# Patient Record
Sex: FEMALE | Race: OTHER | HISPANIC OR LATINO
[De-identification: names, ages, dates, MRNs, and addresses within clinical notes are randomized per-mention and may not be internally consistent; named-entity substitution may affect disease eponyms.]

---

## 2022-06-01 ENCOUNTER — APPOINTMENT (OUTPATIENT)
Dept: GASTROENTEROLOGY | Facility: CLINIC | Age: 62
End: 2022-06-01

## 2024-01-16 ENCOUNTER — INPATIENT (INPATIENT)
Facility: HOSPITAL | Age: 64
LOS: 3 days | Discharge: HOME | DRG: 40 | End: 2024-01-20
Attending: FAMILY MEDICINE | Admitting: FAMILY MEDICINE
Payer: MEDICARE

## 2024-01-16 VITALS
SYSTOLIC BLOOD PRESSURE: 139 MMHG | HEART RATE: 61 BPM | RESPIRATION RATE: 16 BRPM | TEMPERATURE: 97 F | OXYGEN SATURATION: 100 % | DIASTOLIC BLOOD PRESSURE: 91 MMHG

## 2024-01-16 DIAGNOSIS — E78.0 PURE HYPERCHOLESTEROLEMIA: ICD-10-CM

## 2024-01-16 DIAGNOSIS — I10 ESSENTIAL (PRIMARY) HYPERTENSION: ICD-10-CM

## 2024-01-16 DIAGNOSIS — I63.9 CEREBRAL INFARCTION, UNSPECIFIED: ICD-10-CM

## 2024-01-16 LAB
ALBUMIN SERPL ELPH-MCNC: 3.9 G/DL — SIGNIFICANT CHANGE UP (ref 3.3–5.2)
ALP SERPL-CCNC: 116 U/L — SIGNIFICANT CHANGE UP (ref 40–120)
ALT FLD-CCNC: 20 U/L — SIGNIFICANT CHANGE UP
ANION GAP SERPL CALC-SCNC: 13 MMOL/L — SIGNIFICANT CHANGE UP (ref 5–17)
APTT BLD: 37.2 SEC — HIGH (ref 24.5–35.6)
AST SERPL-CCNC: 30 U/L — SIGNIFICANT CHANGE UP
BASOPHILS # BLD AUTO: 0.02 K/UL — SIGNIFICANT CHANGE UP (ref 0–0.2)
BASOPHILS NFR BLD AUTO: 0.3 % — SIGNIFICANT CHANGE UP (ref 0–2)
BILIRUB SERPL-MCNC: 0.5 MG/DL — SIGNIFICANT CHANGE UP (ref 0.4–2)
BUN SERPL-MCNC: 9.4 MG/DL — SIGNIFICANT CHANGE UP (ref 8–20)
CALCIUM SERPL-MCNC: 9 MG/DL — SIGNIFICANT CHANGE UP (ref 8.4–10.5)
CHLORIDE SERPL-SCNC: 94 MMOL/L — LOW (ref 96–108)
CK SERPL-CCNC: 61 U/L — SIGNIFICANT CHANGE UP (ref 25–170)
CO2 SERPL-SCNC: 26 MMOL/L — SIGNIFICANT CHANGE UP (ref 22–29)
CREAT SERPL-MCNC: 0.67 MG/DL — SIGNIFICANT CHANGE UP (ref 0.5–1.3)
EGFR: 97 ML/MIN/1.73M2 — SIGNIFICANT CHANGE UP
EOSINOPHIL # BLD AUTO: 0 K/UL — SIGNIFICANT CHANGE UP (ref 0–0.5)
EOSINOPHIL NFR BLD AUTO: 0 % — SIGNIFICANT CHANGE UP (ref 0–6)
GLUCOSE BLDC GLUCOMTR-MCNC: 92 MG/DL — SIGNIFICANT CHANGE UP (ref 70–99)
GLUCOSE SERPL-MCNC: 103 MG/DL — HIGH (ref 70–99)
HCT VFR BLD CALC: 39 % — SIGNIFICANT CHANGE UP (ref 34.5–45)
HGB BLD-MCNC: 13.2 G/DL — SIGNIFICANT CHANGE UP (ref 11.5–15.5)
IMM GRANULOCYTES NFR BLD AUTO: 1.1 % — HIGH (ref 0–0.9)
INR BLD: 1.15 RATIO — SIGNIFICANT CHANGE UP (ref 0.85–1.18)
LYMPHOCYTES # BLD AUTO: 0.91 K/UL — LOW (ref 1–3.3)
LYMPHOCYTES # BLD AUTO: 11.6 % — LOW (ref 13–44)
MCHC RBC-ENTMCNC: 29.5 PG — SIGNIFICANT CHANGE UP (ref 27–34)
MCHC RBC-ENTMCNC: 33.8 GM/DL — SIGNIFICANT CHANGE UP (ref 32–36)
MCV RBC AUTO: 87.1 FL — SIGNIFICANT CHANGE UP (ref 80–100)
MONOCYTES # BLD AUTO: 0.31 K/UL — SIGNIFICANT CHANGE UP (ref 0–0.9)
MONOCYTES NFR BLD AUTO: 4 % — SIGNIFICANT CHANGE UP (ref 2–14)
NEUTROPHILS # BLD AUTO: 6.5 K/UL — SIGNIFICANT CHANGE UP (ref 1.8–7.4)
NEUTROPHILS NFR BLD AUTO: 83 % — HIGH (ref 43–77)
PLATELET # BLD AUTO: 188 K/UL — SIGNIFICANT CHANGE UP (ref 150–400)
POTASSIUM SERPL-MCNC: 3.6 MMOL/L — SIGNIFICANT CHANGE UP (ref 3.5–5.3)
POTASSIUM SERPL-SCNC: 3.6 MMOL/L — SIGNIFICANT CHANGE UP (ref 3.5–5.3)
PROT SERPL-MCNC: 8 G/DL — SIGNIFICANT CHANGE UP (ref 6.6–8.7)
PROTHROM AB SERPL-ACNC: 12.7 SEC — SIGNIFICANT CHANGE UP (ref 9.5–13)
RBC # BLD: 4.48 M/UL — SIGNIFICANT CHANGE UP (ref 3.8–5.2)
RBC # FLD: 12.9 % — SIGNIFICANT CHANGE UP (ref 10.3–14.5)
SODIUM SERPL-SCNC: 133 MMOL/L — LOW (ref 135–145)
TROPONIN T, HIGH SENSITIVITY RESULT: 10 NG/L — SIGNIFICANT CHANGE UP (ref 0–51)
WBC # BLD: 7.83 K/UL — SIGNIFICANT CHANGE UP (ref 3.8–10.5)
WBC # FLD AUTO: 7.83 K/UL — SIGNIFICANT CHANGE UP (ref 3.8–10.5)

## 2024-01-16 PROCEDURE — 99291 CRITICAL CARE FIRST HOUR: CPT | Mod: FS

## 2024-01-16 PROCEDURE — 0042T: CPT | Mod: MA

## 2024-01-16 PROCEDURE — 99223 1ST HOSP IP/OBS HIGH 75: CPT | Mod: FS

## 2024-01-16 PROCEDURE — 99285 EMERGENCY DEPT VISIT HI MDM: CPT

## 2024-01-16 PROCEDURE — 99223 1ST HOSP IP/OBS HIGH 75: CPT

## 2024-01-16 PROCEDURE — 71045 X-RAY EXAM CHEST 1 VIEW: CPT | Mod: 26

## 2024-01-16 PROCEDURE — 93010 ELECTROCARDIOGRAM REPORT: CPT

## 2024-01-16 PROCEDURE — 70450 CT HEAD/BRAIN W/O DYE: CPT | Mod: 26,MA

## 2024-01-16 PROCEDURE — 93880 EXTRACRANIAL BILAT STUDY: CPT | Mod: 26

## 2024-01-16 PROCEDURE — 99232 SBSQ HOSP IP/OBS MODERATE 35: CPT | Mod: FS

## 2024-01-16 RX ORDER — ASPIRIN/CALCIUM CARB/MAGNESIUM 324 MG
81 TABLET ORAL ONCE
Refills: 0 | Status: COMPLETED | OUTPATIENT
Start: 2024-01-16 | End: 2024-01-16

## 2024-01-16 RX ORDER — INSULIN LISPRO 100/ML
VIAL (ML) SUBCUTANEOUS
Refills: 0 | Status: DISCONTINUED | OUTPATIENT
Start: 2024-01-16 | End: 2024-01-17

## 2024-01-16 RX ORDER — ACETAMINOPHEN 500 MG
1000 TABLET ORAL ONCE
Refills: 0 | Status: COMPLETED | OUTPATIENT
Start: 2024-01-16 | End: 2024-01-16

## 2024-01-16 RX ORDER — DEXTROSE 50 % IN WATER 50 %
25 SYRINGE (ML) INTRAVENOUS ONCE
Refills: 0 | Status: DISCONTINUED | OUTPATIENT
Start: 2024-01-16 | End: 2024-01-20

## 2024-01-16 RX ORDER — GLUCAGON INJECTION, SOLUTION 0.5 MG/.1ML
1 INJECTION, SOLUTION SUBCUTANEOUS ONCE
Refills: 0 | Status: DISCONTINUED | OUTPATIENT
Start: 2024-01-16 | End: 2024-01-17

## 2024-01-16 RX ORDER — INSULIN LISPRO 100/ML
VIAL (ML) SUBCUTANEOUS AT BEDTIME
Refills: 0 | Status: DISCONTINUED | OUTPATIENT
Start: 2024-01-16 | End: 2024-01-17

## 2024-01-16 RX ORDER — SODIUM CHLORIDE 9 MG/ML
1000 INJECTION, SOLUTION INTRAVENOUS
Refills: 0 | Status: DISCONTINUED | OUTPATIENT
Start: 2024-01-16 | End: 2024-01-17

## 2024-01-16 RX ORDER — DEXTROSE 50 % IN WATER 50 %
15 SYRINGE (ML) INTRAVENOUS ONCE
Refills: 0 | Status: DISCONTINUED | OUTPATIENT
Start: 2024-01-16 | End: 2024-01-17

## 2024-01-16 RX ORDER — ASPIRIN/CALCIUM CARB/MAGNESIUM 324 MG
300 TABLET ORAL DAILY
Refills: 0 | Status: DISCONTINUED | OUTPATIENT
Start: 2024-01-16 | End: 2024-01-16

## 2024-01-16 RX ORDER — SODIUM CHLORIDE 9 MG/ML
2000 INJECTION, SOLUTION INTRAVENOUS ONCE
Refills: 0 | Status: COMPLETED | OUTPATIENT
Start: 2024-01-16 | End: 2024-01-16

## 2024-01-16 RX ORDER — HEPARIN SODIUM 5000 [USP'U]/ML
5000 INJECTION INTRAVENOUS; SUBCUTANEOUS EVERY 12 HOURS
Refills: 0 | Status: DISCONTINUED | OUTPATIENT
Start: 2024-01-16 | End: 2024-01-16

## 2024-01-16 RX ORDER — DEXTROSE 50 % IN WATER 50 %
12.5 SYRINGE (ML) INTRAVENOUS ONCE
Refills: 0 | Status: DISCONTINUED | OUTPATIENT
Start: 2024-01-16 | End: 2024-01-20

## 2024-01-16 RX ORDER — ATORVASTATIN CALCIUM 80 MG/1
80 TABLET, FILM COATED ORAL AT BEDTIME
Refills: 0 | Status: DISCONTINUED | OUTPATIENT
Start: 2024-01-16 | End: 2024-01-17

## 2024-01-16 RX ORDER — PANTOPRAZOLE SODIUM 20 MG/1
40 TABLET, DELAYED RELEASE ORAL
Refills: 0 | Status: DISCONTINUED | OUTPATIENT
Start: 2024-01-16 | End: 2024-01-17

## 2024-01-16 RX ORDER — SODIUM CHLORIDE 9 MG/ML
1000 INJECTION INTRAMUSCULAR; INTRAVENOUS; SUBCUTANEOUS
Refills: 0 | Status: DISCONTINUED | OUTPATIENT
Start: 2024-01-16 | End: 2024-01-17

## 2024-01-16 RX ADMIN — Medication 400 MILLIGRAM(S): at 23:57

## 2024-01-16 RX ADMIN — Medication 81 MILLIGRAM(S): at 12:34

## 2024-01-16 RX ADMIN — SODIUM CHLORIDE 2000 MILLILITER(S): 9 INJECTION, SOLUTION INTRAVENOUS at 11:49

## 2024-01-16 RX ADMIN — SODIUM CHLORIDE 70 MILLILITER(S): 9 INJECTION INTRAMUSCULAR; INTRAVENOUS; SUBCUTANEOUS at 22:28

## 2024-01-16 NOTE — ED ADULT TRIAGE NOTE - NS ED NURSE AMBULANCES
[facility], [facility], [facility]

## 2024-01-16 NOTE — CONSULT NOTE ADULT - SUBJECTIVE AND OBJECTIVE BOX
Patient is a 63y old  Female who presents with a chief complaint of stroke workup (16 Jan 2024 14:32)    HPI:  63 year old female with PMH of HTN,HLP presents with c/o Lt sided weakness, with slurrey speech. Pt  was  last seen by family 9 pm last night upon going to bed, she woke up this morning at 5 am with L sided weakness and slurred speech. [facility] mobile stroke unit transported pt, non-con CTH neg, CTA performed while en route demonstrating R M2 occlusion. CT head/perfusion scan ED showed Mild cortical enhancement   right frontal parietal junction corresponding to an area of ischemia seen.CT PERFUSION: Moderate right parietal ischemia . No core infarct..Per ED,EMS documented NIH score 8,on arrival 2 ,mild rt facial droop/lt sided weekness which is resolved now.Ed spoke with Neurology team.Pt is AAOX3.C/O mild rt sided headache, clear speech, denies any weakness/numbness. Pt reports complained with medicines.     Interval Events:   Pt seen and examined in bed. Notes that her right foot and hand are still numb but has no other complaints.       PMH  HTN,HLP    FH:DM  Social hx: denies smoking /alcohol/illicit drug uses.Lives with brother and Niece           from: CT Brain Perfusion Maps Stroke (01.16.24 @ 11:13) >  IMPRESSION:  HEAD CT: IV contrast present from recent CTA. Mild cortical enhancement   right frontal parietal junction corresponding to an area of ischemia seen   on CT perfusion. No large intracranial hemorrhage.  CT PERFUSION: Moderate right parietal ischemia of 31 mL. No core infarct.    Findings discussed with Dr. Wiggins at 11:19 AM on 1/16/2024    < end of copied text >   (16 Jan 2024 12:30)      HISTORY OF PRESENT ILLNESS:   63yF PMH     PAST MEDICAL & SURGICAL HISTORY:  Hypertension      Hypercholesterolemia      No significant past surgical history        FAMILY HISTORY:      SOCIAL HISTORY:  Tobacco Use: no  EtOH use: no  Substance: no    Allergies    No Known Allergies    Intolerances        REVIEW OF SYSTEMS  Negative except as noted in HPI    HOME MEDICATIONS:  Home Medications:  losartan: 50 milligram(s) orally once a day (16 Jan 2024 12:29)  simvastatin: 10 milligram(s) orally once a day (16 Jan 2024 12:30)      MEDICATIONS:  Antibiotics:    Neuro:    Anticoagulation:  heparin   Injectable 5000 Unit(s) SubCutaneous every 12 hours    OTHER:  atorvastatin 80 milliGRAM(s) Oral at bedtime  dextrose 50% Injectable 12.5 Gram(s) IV Push once  dextrose 50% Injectable 25 Gram(s) IV Push once  dextrose 50% Injectable 25 Gram(s) IV Push once  dextrose Oral Gel 15 Gram(s) Oral once PRN  glucagon  Injectable 1 milliGRAM(s) IntraMuscular once  insulin lispro (ADMELOG) corrective regimen sliding scale   SubCutaneous three times a day before meals  insulin lispro (ADMELOG) corrective regimen sliding scale   SubCutaneous at bedtime  pantoprazole    Tablet 40 milliGRAM(s) Oral before breakfast    IVF:  dextrose 5%. 1000 milliLiter(s) IV Continuous <Continuous>  dextrose 5%. 1000 milliLiter(s) IV Continuous <Continuous>  sodium chloride 0.9%. 1000 milliLiter(s) IV Continuous <Continuous>      Vital Signs Last 24 Hrs  T(C): 37 (16 Jan 2024 16:05), Max: 37 (16 Jan 2024 16:05)  T(F): 98.6 (16 Jan 2024 16:05), Max: 98.6 (16 Jan 2024 16:05)  HR: 58 (16 Jan 2024 16:05) (58 - 93)  BP: 137/76 (16 Jan 2024 16:05) (126/59 - 169/73)  BP(mean): --  RR: 18 (16 Jan 2024 16:05) (16 - 18)  SpO2: 98% (16 Jan 2024 16:05) (98% - 100%)    Parameters below as of 16 Jan 2024 16:05  Patient On (Oxygen Delivery Method): room air          PHYSICAL EXAM:  GENERAL: NAD, well-groomed  HEAD:  Atraumatic, normocephalic   EYES: Conjunctiva and sclera clear; corneal reflex intact  NECK: Supple, nontender to palpation  PIERO COMA SCORE: E- V- M- = 15       E: 4= opens eyes spontaneously 3= to voice 2= to noxious 1= no opening       V: 5= oriented 4= confused 3= inappropriate words 2= incomprehensible sounds 1= nonverbal 1T= intubated       M: 6= follows commands 5= localizes 4= withdraws 3= flexor posturing 2= extensor posturing 1= no movement  MENTAL STATUS: AAO x3; Awake; Opens eyes spontaneously; Appropriately conversant without aphasia, slow to speak; following simple commands   CRANIAL NERVES: Viisual fields full to confrontation, PERRL. EOMI without nystagmus. Facial sensation intact V1-3 distribution b/l. Face symmetric w/ normal eye closure and smile, tongue midline. Hearing grossly intact. Speech clear. Head turning and shoulder shrug intact.   MOTOR: strength 5/5 b/l upper and lower extremities  SENSATION: grossly intact to light touch all extremities  PLANTAR: upgoing/downgoing/mute (Babinski)  CHEST/LUNG: Chest rise and fall equally and bilaterally   HEART: +S1/+S2  ABDOMEN: Soft, nontender, nondistended   EXTREMITIES:  No clubbing, cyanosis, or edema  SKIN: Warm, dry; no rashes or lesions    LABS:                        13.2   7.83  )-----------( 188      ( 16 Jan 2024 11:02 )             39.0     01-16    133<L>  |  94<L>  |  9.4  ----------------------------<  103<H>  3.6   |  26.0  |  0.67    Ca    9.0      16 Jan 2024 11:02    TPro  8.0  /  Alb  3.9  /  TBili  0.5  /  DBili  x   /  AST  30  /  ALT  20  /  AlkPhos  116  01-16    PT/INR - ( 16 Jan 2024 11:02 )   PT: 12.7 sec;   INR: 1.15 ratio         PTT - ( 16 Jan 2024 11:02 )  PTT:37.2 sec  Urinalysis Basic - ( 16 Jan 2024 11:02 )    Color: x / Appearance: x / SG: x / pH: x  Gluc: 103 mg/dL / Ketone: x  / Bili: x / Urobili: x   Blood: x / Protein: x / Nitrite: x   Leuk Esterase: x / RBC: x / WBC x   Sq Epi: x / Non Sq Epi: x / Bacteria: x        CULTURES:      RADIOLOGY & ADDITIONAL STUDIES:    ASSESSMENT AND PLAN:     Assessment: 64 y/o female with a past medical history of HTN/HLD presents with left sided weakness and slurred speech. Stroke imaging revealed right M2 occlusion. NIH of 9 on admission, decreased to a 2. mRs 0.     Plan:   Neuro   - pt will need a formal swallow evaluation prior to taking anything ( including medications ) by mouth  - Please check LDL with in 48 hours of arrival, if > 100 pt will need a statin as per stroke core measures  - Pt will require PT eval prior to discharge as per stroke core measures  - Pt will need smoking cessation education discussed with them prior to discharge as per stroke core measures  - Pt to receive stroke education during stay or at discharge   - CTH f/u imaging   - repeat CTH if acute change in neuro exam or questionable blood on f/u CTH   - MM pain control     CV  - BP <220   - TTE     Respiratory   - RA   - No issues     GI   - NPO for now   - will need formal speech and swallow       - monitor i's and o's   - voiding     Heme  - No issues   - monitor H/H     ID   - monitor WBC   - monitor fever curve   - no current issues     Endo   - F/u A1C and TSH   - CHRISTINE

## 2024-01-16 NOTE — CONSULT NOTE ADULT - ASSESSMENT
ASSESSMENT:     NEURO:   -Neurologically with improvement from initial presentation and slight RLE drift and right facial asymmetry (could be baseline as per patient)  -Continue close monitoring for neurologic deterioration    - Stroke neuro checks q 2 hour   - Permissive HTN or  SBP goal   -ANTITHROMBOTIC THERAPY: ASA 81mg daily   -Obtain lipid panel and A1C   -titrate statin to LDL goal less than 70  -Obtain MRI Brain w/o, MRA Head w/o and Neck w/contrast  -Dysphagia screen: pass/fail   -Physical therapy/OT/Speech eval/treatment.     -CARDIOVASCULAR: obtain TTE, cardiac monitoring w/ telemetry for now, further evaluation pending findings of noted workup                              -HEMATOLOGY: H/H 13.2/39.0. Platelets 188. patient should have all age and risk appropriate malignancy screenings with PCP or sooner if clinically suspected      DVT ppx: Heparin s.c [] LMWH []     PULMONARY: CXR ___ , protecting airway, saturating well     RENAL: BUN/Cr _, monitor urine output, maintain adequate hydration       Na Goal:  135-145     [place]:    ID: afebrile, no leukocytosis, monitor for si/sx of infection     OTHER:  condition and plan of care d/w patient, questions and concerns addressed.     DISPOSITION: Rehab or home depending on PT eval once stable and workup is complete      CORE MEASURES:        Admission NIHSS:      Tenecteplase : [] YES [] NO      LDL/HDL/A1C:     Depression Screen- if depression hx and/or present      Statin Therapy:     Dysphagia Screen: [] PASS [] FAIL     Smoking [] YES [] NO      Afib [] YES [] NO     Stroke Education [] YES [] NO    Obtain screening lower extremity venous ultrasound in patients who meet 1 or more of the following criteria as patient is high risk for DVT/PE on admission:   [] History of DVT/PE  []Hypercoagulable states (Factor V Leiden, Cancer, OCP, etc. )  []Prolonged immobility (hemiplegia/hemiparesis/post operative or any other extended immobilization)  [] Transferred from outside facility (Rehab or Long term care)  [] Age </= to 50 ASSESSMENT:     NEURO:   -Neurologically with improvement from initial presentation and slight RLE drift and right facial asymmetry (could be baseline as per patient)  -Continue close monitoring for neurologic deterioration    - Stroke neuro checks q 2 hour   - Permissive HTN of SBP up to 180. Avoid rapid fluctuations and hypotension.    -ANTITHROMBOTIC THERAPY: ASA 81mg daily   -Obtain lipid panel and A1C   -titrate statin to LDL goal less than 70  -Obtain MRI Brain w/o, MRA Head w/o and Neck w/contrast  -Dysphagia screen: Pass. advance as tolerated.  -Physical therapy/OT/Speech eval/treatment.     -CARDIOVASCULAR: obtain TTE, cardiac monitoring w/ telemetry for now, further evaluation pending findings of noted workup                              -HEMATOLOGY: H/H 13.2/39.0. Platelets 188. patient should have all age and risk appropriate malignancy screenings with PCP or sooner if clinically suspected      DVT ppx: Heparin s.c [x] LMWH []     PULMONARY: CXR noting no focal consolidation. Did note a 7 mm dense nodule at the right lung base may represent a granuloma. A noncontrast CT could be performed for confirmation.   -protecting airway, saturating well     RENAL: BUN/Cr 9.4/0.67.  monitor urine output, maintain adequate hydration       Na Goal:  135-145    ID: afebrile, no leukocytosis, monitor for si/sx of infection     OTHER:  condition and plan of care d/w patient, questions and concerns addressed.     DISPOSITION: Rehab or home depending on PT eval once stable and workup is complete      CORE MEASURES:        Admission NIHSS: 3     Tenecteplase : [] YES [x] NO      LDL/HDL/A1C: pending       Depression Screen- if depression hx and/or present      Statin Therapy: pending      Dysphagia Screen: [x] PASS [] FAIL     Smoking [] YES [x] NO      Afib [] YES [x] NO     Stroke Education [] YES [] NO- pending and ordered     Obtain screening lower extremity venous ultrasound in patients who meet 1 or more of the following criteria as patient is high risk for DVT/PE on admission:   [] History of DVT/PE  []Hypercoagulable states (Factor V Leiden, Cancer, OCP, etc. )  []Prolonged immobility (hemiplegia/hemiparesis/post operative or any other extended immobilization)  [] Transferred from outside facility (Rehab or Long term care)  [] Age </= to 50 ASSESSMENT: Patient is a 63 year old female with PMHx of HTN,HLD who presented to [facility] on 1/16/24 with as per ED note left sided weakness and slurred speech. As per ED note patient was last seen by family 9:00 pm on 1/15/24 upon going to bed. Patient woke up the morning of 1/16/24 at 5:00 am with left sided weakness and slurred speech. [facility] mobile stroke unit transported patient and non-con CTH was negative and CTA performed while en route demonstrated right M2 occlusion. CTP in [facility] ED showed mild cortical enhancement of theright frontal parietal junction corresponding to an area of ischemia seen Per ED,EMS documented NIHSS score of 8, however upon arrival to [facility] NIHSS was a 2. Upon stroke team evaluation patient complained of right hand numbness and complete resolution of left sided weakness and dysarthria. Patient not a tenecteplase or mechanical thrombectomy candidate due to improving symptoms and almost back to baseline.     Etiology embolic stroke of undetermined source. will need MRI brain w/out contrast for further evaluation and management.     NEURO:   -Neurologically with improvement from initial presentation and slight RLE drift and right facial asymmetry (could be baseline as per patient)  -Continue close monitoring for neurologic deterioration    - Close monitoring for change in neurological status as patient is still within the intervention window and with right M2 occlusion. Stroke neuro checks q 1 hour as per neuro ICU  -SBP goal   - There is potential hyperdensity noted on the CT head. Would suggest CT head noncon in 6 hours to rule out any potential hemorrhage   -Avoid rapid fluctuations and hypotension.    -ANTITHROMBOTIC THERAPY: ASA 81mg daily   -Obtain lipid panel and A1C   -titrate statin to LDL goal less than 70  -Obtain MRI Brain w/o, MRA Head w/o and Neck w/contrast  -Dysphagia screen: Pass. advance as tolerated.  -Physical therapy/OT/Speech eval/treatment.     -CARDIOVASCULAR: obtain TTE, cardiac monitoring w/ telemetry for now, further evaluation pending findings of noted workup                              -HEMATOLOGY: H/H 13.2/39.0. Platelets 188. patient should have all age and risk appropriate malignancy screenings with PCP or sooner if clinically suspected      DVT ppx: Heparin s.c [x] LMWH []     PULMONARY: CXR noting no focal consolidation. Did note a 7 mm dense nodule at the right lung base may represent a granuloma. A noncontrast CT could be performed for confirmation.   -protecting airway, saturating well     RENAL: BUN/Cr 9.4/0.67.  monitor urine output, maintain adequate hydration       Na Goal:  135-145    ID: afebrile, no leukocytosis, monitor for si/sx of infection     OTHER:  condition and plan of care d/w patient, questions and concerns addressed.     DISPOSITION: Rehab or home depending on PT eval once stable and workup is complete      CORE MEASURES:        Admission NIHSS: 3     Tenecteplase : [] YES [x] NO      LDL/HDL/A1C: pending       Depression Screen- if depression hx and/or present      Statin Therapy: pending      Dysphagia Screen: [x] PASS [] FAIL     Smoking [] YES [x] NO      Afib [] YES [x] NO     Stroke Education [] YES [] NO- pending and ordered     Obtain screening lower extremity venous ultrasound in patients who meet 1 or more of the following criteria as patient is high risk for DVT/PE on admission:   [] History of DVT/PE  []Hypercoagulable states (Factor V Leiden, Cancer, OCP, etc. )  []Prolonged immobility (hemiplegia/hemiparesis/post operative or any other extended immobilization)  [] Transferred from outside facility (Rehab or Long term care)  [] Age </= to 50 ASSESSMENT: Patient is a 63 year old female with PMHx of HTN,HLD who presented to [facility] on 1/16/24 with as per ED note left sided weakness and slurred speech. As per ED note patient was last seen by family 9:00 pm on 1/15/24 upon going to bed. Patient woke up the morning of 1/16/24 at 5:00 am with left sided weakness and slurred speech. [facility] mobile stroke unit transported patient and non-con CTH was negative and CTA performed while en route demonstrated right M2 occlusion. CTP in [facility] ED showed mild cortical enhancement of theright frontal parietal junction corresponding to an area of ischemia seen Per ED,EMS documented NIHSS score of 8, however upon arrival to [facility] NIHSS was a 2. Upon stroke team evaluation patient complained of right hand numbness and complete resolution of left sided weakness and dysarthria. Patient not a tenecteplase or mechanical thrombectomy candidate due to improving symptoms and almost back to baseline.     Etiology embolic stroke of undetermined source. will need MRI brain w/out contrast for further evaluation and management.     NEURO:   -Neurologically with improvement from initial presentation and slight RLE drift and right facial asymmetry (could be baseline as per patient)  -Continue close monitoring for neurologic deterioration    - Close monitoring for change in neurological status as patient is still within the intervention window and with right M2 occlusion. Stroke neuro checks q 1 hour as per neuro ICU  -SBP goal   - There is potential hyperdensity noted on the CT head. Would suggest CT head noncon in 6 hours to rule out any potential hemorrhage   -Avoid rapid fluctuations and hypotension.    -ANTITHROMBOTIC THERAPY: ASA 81mg daily   -Obtain lipid panel and A1C   -titrate statin to LDL goal less than 70  -Obtain MRI Brain w/o, MRA Head w/o and Neck w/contrast  -Dysphagia screen: Pass. advance as tolerated.  -Physical therapy/OT/Speech eval/treatment.     -CARDIOVASCULAR: obtain TTE, cardiac monitoring w/ telemetry for now, further evaluation pending findings of noted workup                              -HEMATOLOGY: H/H 13.2/39.0. Platelets 188. patient should have all age and risk appropriate malignancy screenings with PCP or sooner if clinically suspected      DVT ppx: Heparin s.c [x] LMWH []     PULMONARY: CXR noting no focal consolidation. Did note a 7 mm dense nodule at the right lung base may represent a granuloma. A noncontrast CT could be performed for confirmation.   -protecting airway, saturating well     RENAL: BUN/Cr 9.4/0.67.  monitor urine output, maintain adequate hydration       Na Goal:  135-145    ID: afebrile, no leukocytosis, monitor for si/sx of infection     OTHER:  condition and plan of care d/w patient, questions and concerns addressed.     DISPOSITION: Rehab or home depending on PT eval once stable and workup is complete      CORE MEASURES:        Admission NIHSS: 3     Tenecteplase : [] YES [x] NO      LDL/HDL/A1C: pending       Depression Screen- if depression hx and/or present      Statin Therapy: pending      Dysphagia Screen: [x] PASS [] FAIL     Smoking [] YES [x] NO      Afib [] YES [x] NO     Stroke Education [] YES [] NO- pending and ordered     Obtain screening lower extremity venous ultrasound in patients who meet 1 or more of the following criteria as patient is high risk for DVT/PE on admission:   [] History of DVT/PE  []Hypercoagulable states (Factor V Leiden, Cancer, OCP, etc. )  []Prolonged immobility (hemiplegia/hemiparesis/post operative or any other extended immobilization)  [] Transferred from outside facility (Rehab or Long term care)  [] Age </= to 50 ASSESSMENT: Patient is a 63 year old female with PMHx of HTN,HLD who presented to [facility] on 1/16/24 with as per ED note left sided weakness and slurred speech. As per ED note patient was last seen by family 9:00 pm on 1/15/24 upon going to bed. Patient woke up the morning of 1/16/24 at 5:00 am with left sided weakness and slurred speech. [facility] mobile stroke unit transported patient and non-con CTH was negative and CTA performed while en route demonstrated right M2 occlusion. CTP in [facility] ED showed mild cortical enhancement of theright frontal parietal junction corresponding to an area of ischemia seen Per ED,EMS documented NIHSS score of 8, however upon arrival to [facility] NIHSS was a 2. Upon stroke team evaluation patient complained of right hand numbness and complete resolution of left sided weakness and dysarthria. Patient not a tenecteplase or mechanical thrombectomy candidate due to improving symptoms and almost back to baseline. Patient admitted to neuro ICU for further monitoring for change in neurological exam.    Etiology embolic stroke of undetermined source. will need MRI brain w/out contrast for further evaluation and management.     NEURO:   -Neurologically with improvement from initial presentation and slight RLE drift and right facial asymmetry (could be baseline as per patient)  -Continue close monitoring for neurologic deterioration    - Close monitoring for change in neurological status as patient is still within the intervention window and with right M2 occlusion. Stroke neuro checks q 1 hour as per neuro ICU  - There is potential hyperdensity noted on the CT head. Would suggest CT head noncon and CTA head w/ and CTA neck w/ in 6 hours for further evaluation   -SBP goal 140-160. Avoid rapid fluctuations and hypotension.    -ANTITHROMBOTIC THERAPY: ASA 81mg daily.  -Obtain lipid panel and A1C   -titrate statin to LDL goal less than 70  -Obtain MRI Brain w/o, MRA Head w/o and Neck w/contrast  -Dysphagia screen: Pass. advance as tolerated.  -Physical therapy/OT/Speech eval/treatment.     -CARDIOVASCULAR: obtain TTE. cardiac monitoring w/ telemetry for now, further evaluation pending findings of noted workup                              -HEMATOLOGY: H/H 13.2/39.0. Platelets 188. patient should have all age and risk appropriate malignancy screenings with PCP or sooner if clinically suspected      DVT ppx: Heparin s.c [x] LMWH []     PULMONARY: CXR noting no focal consolidation. Did note a 7 mm dense nodule at the right lung base may represent a granuloma. A noncontrast CT could be performed for confirmation.   -protecting airway, saturating well     RENAL: BUN/Cr 9.4/0.67.  monitor urine output, maintain adequate hydration       Na Goal:  135-145    ID: afebrile, no leukocytosis, monitor for si/sx of infection     OTHER:  condition and plan of care d/w patient, questions and concerns addressed.     DISPOSITION: Rehab or home depending on PT eval once stable and workup is complete      CORE MEASURES:        Admission NIHSS: 3     Tenecteplase : [] YES [x] NO      LDL/HDL/A1C: pending       Depression Screen- if depression hx and/or present      Statin Therapy: pending      Dysphagia Screen: [x] PASS [] FAIL     Smoking [] YES [x] NO      Afib [] YES [x] NO     Stroke Education [] YES [] NO- pending and ordered     Obtain screening lower extremity venous ultrasound in patients who meet 1 or more of the following criteria as patient is high risk for DVT/PE on admission:   [] History of DVT/PE  []Hypercoagulable states (Factor V Leiden, Cancer, OCP, etc. )  []Prolonged immobility (hemiplegia/hemiparesis/post operative or any other extended immobilization)  [] Transferred from outside facility (Rehab or Long term care)  [] Age </= to 50 ASSESSMENT: Patient is a 63 year old female with PMHx of HTN,HLD who presented to [facility] on 1/16/24 with as per ED note left sided weakness and slurred speech. As per ED note patient was last seen by family 9:00 pm on 1/15/24 upon going to bed. Patient woke up the morning of 1/16/24 at 5:00 am with left sided weakness and slurred speech. [facility] mobile stroke unit transported patient and non-con CTH was negative and CTA performed while en route demonstrated right M2 occlusion. CTP in [facility] ED showed mild cortical enhancement of theright frontal parietal junction corresponding to an area of ischemia seen Per ED,EMS documented NIHSS score of 8, however upon arrival to [facility] NIHSS was a 2. Upon stroke team evaluation patient complained of right hand numbness and complete resolution of left sided weakness and dysarthria. Patient not a tenecteplase or mechanical thrombectomy candidate due to improving symptoms and almost back to baseline. Patient admitted to neuro ICU for further monitoring for change in neurological exam.    Etiology embolic stroke of undetermined source. will need MRI brain w/out contrast for further evaluation and management.     NEURO:   -Neurologically with improvement from initial presentation and slight RLE drift and right facial asymmetry (could be baseline as per patient)  -Continue close monitoring for neurologic deterioration    - Close monitoring for change in neurological status as patient is still within the intervention window and with right M2 occlusion. Stroke neuro checks q 1 hour as per neuro ICU  - There is potential hyperdensity noted on the CT head. Would suggest CT head noncon and CTA head w/ and CTA neck w/ in 6 hours for further evaluation   -SBP goal 140-160. Avoid rapid fluctuations and hypotension.    -ANTITHROMBOTIC THERAPY: ASA 81mg daily.  -Obtain lipid panel and A1C   -titrate statin to LDL goal less than 70  -Obtain MRI Brain w/o, MRA Head w/o and Neck w/contrast  -Dysphagia screen: Pass. advance as tolerated.  -Physical therapy/OT/Speech eval/treatment.     -CARDIOVASCULAR: obtain TTE. cardiac monitoring w/ telemetry for now, further evaluation pending findings of noted workup                              -HEMATOLOGY: H/H 13.2/39.0. Platelets 188. patient should have all age and risk appropriate malignancy screenings with PCP or sooner if clinically suspected      DVT ppx: Heparin s.c [x] LMWH []     PULMONARY: CXR noting no focal consolidation. Did note a 7 mm dense nodule at the right lung base may represent a granuloma. A noncontrast CT could be performed for confirmation.   -protecting airway, saturating well     RENAL: BUN/Cr 9.4/0.67.  monitor urine output, maintain adequate hydration       Na Goal:  135-145    ID: afebrile, no leukocytosis, monitor for si/sx of infection     OTHER:  condition and plan of care d/w patient, questions and concerns addressed.     DISPOSITION: Rehab or home depending on PT eval once stable and workup is complete      CORE MEASURES:        Admission NIHSS: 3     Tenecteplase : [] YES [x] NO      LDL/HDL/A1C: pending       Depression Screen- if depression hx and/or present      Statin Therapy: pending      Dysphagia Screen: [x] PASS [] FAIL     Smoking [] YES [x] NO      Afib [] YES [x] NO     Stroke Education [] YES [] NO- pending and ordered     Obtain screening lower extremity venous ultrasound in patients who meet 1 or more of the following criteria as patient is high risk for DVT/PE on admission:   [] History of DVT/PE  []Hypercoagulable states (Factor V Leiden, Cancer, OCP, etc. )  []Prolonged immobility (hemiplegia/hemiparesis/post operative or any other extended immobilization)  [] Transferred from outside facility (Rehab or Long term care)  [] Age </= to 50

## 2024-01-16 NOTE — H&P ADULT - HISTORY OF PRESENT ILLNESS
63 year old female with PMH of HTN,HLP presents with c/o Lt sided weakness, with slurrey speech. Pt  was  last seen by family 9 pm last night upon going to bed, she woke up this morning at 5 am with L sided weakness and slurred speech. [facility] mobile stroke unit transported pt, non-con CTH neg, CTA performed while en route demonstrating R M2 occlusion. CT head/perfusion scan ED showed Mild cortical enhancement   right frontal parietal junction corresponding to an area of ischemia seen.CT PERFUSION: Moderate right parietal ischemia . No core infarct..Per ED,EMS documented NIH score 8,on arrival 2 ,mild rt facial droop/lt sided weekness which is resolved now.Ed spoke with Neurology team.Pt is AAOX3.C/O mild rt sided headache, clear speech, denies any weakness/numbness. Pt reports complained with medicines.       PMH  HTN,HLP    FH:DM  Social hx: denies smoking /alcohol/illicit drug uses.Lives with brother and Niece           from: CT Brain Perfusion Maps Stroke (01.16.24 @ 11:13) >  IMPRESSION:  HEAD CT: IV contrast present from recent CTA. Mild cortical enhancement   right frontal parietal junction corresponding to an area of ischemia seen   on CT perfusion. No large intracranial hemorrhage.  CT PERFUSION: Moderate right parietal ischemia of 31 mL. No core infarct.    Findings discussed with Dr. Wiggins at 11:19 AM on 1/16/2024    < end of copied text >    63 year old female with PMH of HTN,HLP presents with c/o Lt sided weakness, with slurrey speech. Pt  was  last seen by family 9 pm last night upon going to bed, she woke up this morning at 5 am with L sided weakness and slurred speech. [facility] mobile stroke unit transported pt, non-con CTH neg, CTA performed while en route demonstrating R M2 occlusion. CT head/perfusion scan ED showed Mild cortical enhancement   right frontal parietal junction corresponding to an area of ischemia seen.CT PERFUSION: Moderate right parietal ischemia . No core infarct..Per ED,EMS documented NIH score 8,on arrival 2 ,mild rt facial droop/lt sided weekness which is resolved now.Ed spoke with Neurology team.Pt is AAOX3.C/O mild rt sided headache, clear speech, denies any weakness/numbness. Pt reports complained with medicines.       PMH  HTN,HLP    FH:DM  Social hx: denies smoking /alcohol/illicit drug uses.Lives with brother and Niece           from: CT Brain Perfusion Maps Stroke (01.16.24 @ 11:13) >  IMPRESSION:  HEAD CT: IV contrast present from recent CTA. Mild cortical enhancement   right frontal parietal junction corresponding to an area of ischemia seen   on CT perfusion. No large intracranial hemorrhage.  CT PERFUSION: Moderate right parietal ischemia of 31 mL. No core infarct.    Findings discussed with Dr. Wiggins at 11:19 AM on 1/16/2024    < end of copied text >

## 2024-01-16 NOTE — H&P ADULT - NSHPPHYSICALEXAM_GEN_ALL_CORE
T(C): 36.6 (01-16-24 @ 11:46), Max: 36.6 (01-16-24 @ 11:46)  HR: 93 (01-16-24 @ 11:46) (61 - 93)  BP: 169/73 (01-16-24 @ 11:46) (139/91 - 169/73)  RR: 16 (01-16-24 @ 11:46) (16 - 16)  SpO2: 100% (01-16-24 @ 11:46) (100% - 100%)    GEN - NAD  HEENT - NCAT, EOMI, FELICIA  RESP - CTA BL, no wheeze/rhonchi/crackles. not on supplemental O2.  CARDIO - NS1S2, RRR. No murmurs  ABD - Soft/Non tender/Non distended. Normal BS x4 quadrants.   Ext - No CRISTIANE.  MSK - full ROM of BL upper and lower extremities without pain or restriction. BL 5/5 strength on upper and lower extremities.   Neuro - cn 2-12 grossly intact..no visible seizure activity appreciated. no tremor. gait not observed. no facial drop present.  Psych- AAOx3. . attentive. normal affect.

## 2024-01-16 NOTE — H&P ADULT - ASSESSMENT
63 year old female with PMH of HTN,HLP presents with c/o Lt sided weakness, with slurrey speech. Pt  was  last seen by family 9 pm last night upon going to bed, she woke up this morning at 5 am with L sided weakness and slurred speech. [facility] mobile stroke unit transported pt, non-con CTH neg, CTA performed while en route demonstrating R M2 occlusion. CT head/perfusion scan ED showed Mild cortical enhancement   right frontal parietal junction corresponding to an area of ischemia seen.CT PERFUSION: Moderate right parietal ischemia . No core infarct.    Acute CVA   -CTA performed while en route demonstrating R M2 occlusion.   -CT head/perfusion scan ED showed Mild cortical enhancement right frontal parietal junction corresponding to an area of ischemia seen.  -CT PERFUSION: Moderate right parietal ischemia   -Neurocheck q2hr  -Permissive htn  -Passed dysphagia screen  -MRI Brain  -ASA,Statin/sq heparin  -SLP/PT/OT  -a1c,Lipid panel  -Monitor NSR, NL trop  -TTE  -f/u Neurology rec      HTN  -hold losartan now   -permissive htn    HLP  -Statin  -lipid panel    dvt ppx sq heparin  gi ppx ppi    Plan of care dw pt via  #[id number]  63 year old female with PMH of HTN,HLP presents with c/o Lt sided weakness, with slurrey speech. Pt  was  last seen by family 9 pm last night upon going to bed, she woke up this morning at 5 am with L sided weakness and slurred speech. [facility] mobile stroke unit transported pt, non-con CTH neg, CTA performed while en route demonstrating R M2 occlusion. CT head/perfusion scan ED showed Mild cortical enhancement   right frontal parietal junction corresponding to an area of ischemia seen.CT PERFUSION: Moderate right parietal ischemia . No core infarct.    Acute CVA   -CTA performed while en route demonstrating R M2 occlusion.   -CT head/perfusion scan ED showed Mild cortical enhancement right frontal parietal junction corresponding to an area of ischemia seen.  -CT PERFUSION: Moderate right parietal ischemia   -Neurocheck q2hr  -Permissive htn  -Passed dysphagia screen  -MRI Brain  -ASA,Statin/sq heparin  -SLP/PT/OT  -a1c,Lipid panel  -Monitor NSR, NL trop  -TTE  -f/u Neurology rec      HTN  -hold losartan now   -permissive htn    HLP  -Statin  -lipid panel    dvt ppx sq heparin  gi ppx ppi    Plan of care dw pt via  #[id number]

## 2024-01-16 NOTE — CONSULT NOTE ADULT - SUBJECTIVE AND OBJECTIVE BOX
Preliminary note, official note pending attending review/signature.                               [facility] Stroke Team  CC:  HPI:     PAST MEDICAL & SURGICAL HISTORY:  Hypertension  Hypercholesterolemia  No significant past surgical history  MEDICATIONS  (STANDING):  atorvastatin 80 milliGRAM(s) Oral at bedtime  heparin   Injectable 5000 Unit(s) SubCutaneous every 12 hours  pantoprazole    Tablet 40 milliGRAM(s) Oral before breakfast      Allergies-No Known Allergies    SOCIAL HISTORY:  no tob,   no alcohol   no drugs    FAMILY HISTORY:  denies any fam stroke hx     ROS: 14 point ROS negative other than what is present in HPI or below    Vital Signs Last 24 Hrs  T(C): 36.5 (16 Jan 2024 12:50), Max: 36.6 (16 Jan 2024 11:46)  T(F): 97.7 (16 Jan 2024 12:50), Max: 97.8 (16 Jan 2024 11:46)  HR: 65 (16 Jan 2024 14:05) (61 - 93)  BP: 159/70 (16 Jan 2024 14:05) (126/59 - 169/73)  BP(mean): --  RR: 18 (16 Jan 2024 14:05) (16 - 18)  SpO2: 98% (16 Jan 2024 14:05) (98% - 100%)    Parameters below as of 16 Jan 2024 14:05  Patient On (Oxygen Delivery Method): room air    General: NAD    Detailed Neurologic Exam:    Mental status: The patient is awake and alert and has normal attention span.  The patient is fully oriented in 3 spheres. The patient is oriented to current events. The patient is able to name objects, follow commands, repeat sentences.    Cranial nerves: Right facial asymmetry. Pupils equal and react symmetrically to light. There is no visual field deficit to confrontation. Extraocular motion is full with no nystagmus. There is no ptosis. Facial sensation is intact. Tongue is midline.    Motor: There is normal bulk and tone.  There is no tremor.  Strength is 5/5 in the right arm. No drift   RLE: Strength 5/5 with drift that does not hit the bed within 10 sec   Strength is 5/5 in the left arm and leg.    Sensation: Intact to light touch and pin in 4 extremities. no extinction.     Cerebellar: There is no dysmetria on finger to nose testing.    Gait : deferred    NIH SS: 2  DATE: 1/16/24  TIME: 11:00 am   1A: Level of consciousness (0-3): 0  1B: Questions (0-2): 0  1C: Commands (0-2): 0  2: Gaze (0-2): 0  3: Visual fields (0-3): 0  4: Facial palsy (0-3): 1 (possibly baseline as per patient)  MOTOR:  5A: Left arm motor drift (0-4): 0  5B: Right arm motor drift (0-4): 0  6A: Left leg motor drift (0-4): 0  6B: Right leg motor drift (0-4): 1  7: Limb ataxia (0-2): 0  SENSORY:  8: Sensation (0-2): 0  SPEECH:  9: Language (0-3): 0  10: Dysarthria (0-2): 0  EXTINCTION:  11: Extinction/inattention (0-2): 0    TOTAL SCORE: 2    prehospital mRS=0      LABS:                         13.2   7.83  )-----------( 188      ( 16 Jan 2024 11:02 )             39.0       01-16    133<L>  |  94<L>  |  9.4  ----------------------------<  103<H>  3.6   |  26.0  |  0.67    Ca    9.0      16 Jan 2024 11:02    TPro  8.0  /  Alb  3.9  /  TBili  0.5  /  DBili  x   /  AST  30  /  ALT  20  /  AlkPhos  116  01-16      PT/INR - ( 16 Jan 2024 11:02 )   PT: 12.7 sec;   INR: 1.15 ratio         PTT - ( 16 Jan 2024 11:02 )  PTT:37.2 sec    Lipid panel: pending     HgbA1c: pending     RADIOLOGY & ADDITIONAL STUDIES (independently reviewed unless otherwise noted):  CT Brain/CTP Stroke Protocol (01.16.24 @ 11:08)   IMPRESSION:  HEAD CT: IV contrast present from recent CTA. Mild cortical enhancement   right frontal parietal junction corresponding to an area of ischemia seen   on CT perfusion. No large intracranial hemorrhage.  CT PERFUSION: Moderate right parietal ischemia of 31 mL. No core infarct.           Preliminary note, official note pending attending review/signature.                               [facility] Stroke Team  CC:  HPI:     PAST MEDICAL & SURGICAL HISTORY:  Hypertension  Hypercholesterolemia  No significant past surgical history  MEDICATIONS  (STANDING):  atorvastatin 80 milliGRAM(s) Oral at bedtime  heparin   Injectable 5000 Unit(s) SubCutaneous every 12 hours  pantoprazole    Tablet 40 milliGRAM(s) Oral before breakfast      Allergies-No Known Allergies    SOCIAL HISTORY:  no tob,   no alcohol   no drugs    FAMILY HISTORY:  denies any fam stroke hx     ROS: 14 point ROS negative other than what is present in HPI or below    Vital Signs Last 24 Hrs  T(C): 36.5 (16 Jan 2024 12:50), Max: 36.6 (16 Jan 2024 11:46)  T(F): 97.7 (16 Jan 2024 12:50), Max: 97.8 (16 Jan 2024 11:46)  HR: 65 (16 Jan 2024 14:05) (61 - 93)  BP: 159/70 (16 Jan 2024 14:05) (126/59 - 169/73)  BP(mean): --  RR: 18 (16 Jan 2024 14:05) (16 - 18)  SpO2: 98% (16 Jan 2024 14:05) (98% - 100%)    Parameters below as of 16 Jan 2024 14:05  Patient On (Oxygen Delivery Method): room air    General: NAD    Detailed Neurologic Exam:    Mental status: The patient is awake and alert and has normal attention span.  The patient is fully oriented in 3 spheres. The patient is oriented to current events. The patient is able to name objects, follow commands, repeat sentences.    Cranial nerves: Right facial asymmetry. Pupils equal and react symmetrically to light. There is no visual field deficit to confrontation. Extraocular motion is full with no nystagmus. There is no ptosis. Facial sensation is intact. Tongue is midline.    Motor: There is normal bulk and tone.  There is no tremor.  Strength is 5/5 in the right arm. No drift   RLE: Strength 5/5 with drift that does not hit the bed within 10 sec   Strength is 5/5 in the left arm and leg.    Sensation: Intact to light touch and pin in 4 extremities. no extinction.     Cerebellar: There is no dysmetria on finger to nose testing.    Gait : deferred    NIH SS: 2  DATE: 1/16/24  TIME: 11:00 am   1A: Level of consciousness (0-3): 0  1B: Questions (0-2): 0  1C: Commands (0-2): 0  2: Gaze (0-2): 0  3: Visual fields (0-3): 0  4: Facial palsy (0-3): 1 (possibly baseline as per patient)  MOTOR:  5A: Left arm motor drift (0-4): 0  5B: Right arm motor drift (0-4): 0  6A: Left leg motor drift (0-4): 0  6B: Right leg motor drift (0-4): 1  7: Limb ataxia (0-2): 0  SENSORY:  8: Sensation (0-2): 0  SPEECH:  9: Language (0-3): 0  10: Dysarthria (0-2): 0  EXTINCTION:  11: Extinction/inattention (0-2): 0    TOTAL SCORE: 2    prehospital mRS=0      LABS:                         13.2   7.83  )-----------( 188      ( 16 Jan 2024 11:02 )             39.0       01-16    133<L>  |  94<L>  |  9.4  ----------------------------<  103<H>  3.6   |  26.0  |  0.67    Ca    9.0      16 Jan 2024 11:02    TPro  8.0  /  Alb  3.9  /  TBili  0.5  /  DBili  x   /  AST  30  /  ALT  20  /  AlkPhos  116  01-16      PT/INR - ( 16 Jan 2024 11:02 )   PT: 12.7 sec;   INR: 1.15 ratio         PTT - ( 16 Jan 2024 11:02 )  PTT:37.2 sec    Lipid panel: pending     HgbA1c: pending     RADIOLOGY & ADDITIONAL STUDIES (independently reviewed unless otherwise noted):  CT Brain/CTP Stroke Protocol (01.16.24 @ 11:08)   IMPRESSION:  HEAD CT: IV contrast present from recent CTA. Mild cortical enhancement   right frontal parietal junction corresponding to an area of ischemia seen   on CT perfusion. No large intracranial hemorrhage.  CT PERFUSION: Moderate right parietal ischemia of 31 mL. No core infarct.           Preliminary note, official note pending attending review/signature.                               [facility] Stroke Team  CC:  HPI:     PAST MEDICAL & SURGICAL HISTORY:  Hypertension  Hypercholesterolemia  No significant past surgical history  MEDICATIONS  (STANDING):  atorvastatin 80 milliGRAM(s) Oral at bedtime  heparin   Injectable 5000 Unit(s) SubCutaneous every 12 hours  pantoprazole    Tablet 40 milliGRAM(s) Oral before breakfast      Allergies-No Known Allergies    SOCIAL HISTORY:  no tob,   no alcohol   no drugs    FAMILY HISTORY:  denies any fam stroke hx     ROS: 14 point ROS negative other than what is present in HPI or below    Vital Signs Last 24 Hrs  T(C): 36.5 (16 Jan 2024 12:50), Max: 36.6 (16 Jan 2024 11:46)  T(F): 97.7 (16 Jan 2024 12:50), Max: 97.8 (16 Jan 2024 11:46)  HR: 65 (16 Jan 2024 14:05) (61 - 93)  BP: 159/70 (16 Jan 2024 14:05) (126/59 - 169/73)  BP(mean): --  RR: 18 (16 Jan 2024 14:05) (16 - 18)  SpO2: 98% (16 Jan 2024 14:05) (98% - 100%)    Parameters below as of 16 Jan 2024 14:05  Patient On (Oxygen Delivery Method): room air    General: NAD    Detailed Neurologic Exam:    Mental status: The patient is awake and alert and has normal attention span.  The patient is fully oriented in 3 spheres. The patient is oriented to current events. The patient is able to name objects, follow commands, repeat sentences.    Cranial nerves: Right facial asymmetry. Pupils equal and react symmetrically to light. There is no visual field deficit to confrontation. Extraocular motion is full with no nystagmus. There is no ptosis. Facial sensation is intact. Tongue is midline.    Motor: There is normal bulk and tone.  There is no tremor.  Strength is 5/5 in the right arm. No drift   RLE: Strength 5/5 with drift that does not hit the bed within 10 sec   Strength is 5/5 in the left arm and leg.    Sensation: Intact to light touch and pin in 4 extremities. no extinction.     Cerebellar: There is no dysmetria on finger to nose testing.    Gait : deferred    NIH SS: 2  DATE: 1/16/24  TIME: 11:00 am   1A: Level of consciousness (0-3): 0  1B: Questions (0-2): 0  1C: Commands (0-2): 0  2: Gaze (0-2): 0  3: Visual fields (0-3): 0  4: Facial palsy (0-3): 1 (possibly baseline as per patient)  MOTOR:  5A: Left arm motor drift (0-4): 0  5B: Right arm motor drift (0-4): 0  6A: Left leg motor drift (0-4): 0  6B: Right leg motor drift (0-4): 1  7: Limb ataxia (0-2): 0  SENSORY:  8: Sensation (0-2): 0  SPEECH:  9: Language (0-3): 0  10: Dysarthria (0-2): 0  EXTINCTION:  11: Extinction/inattention (0-2): 0    TOTAL SCORE: 2    prehospital mRS=0      LABS:                         13.2   7.83  )-----------( 188      ( 16 Jan 2024 11:02 )             39.0       01-16    133<L>  |  94<L>  |  9.4  ----------------------------<  103<H>  3.6   |  26.0  |  0.67    Ca    9.0      16 Jan 2024 11:02    TPro  8.0  /  Alb  3.9  /  TBili  0.5  /  DBili  x   /  AST  30  /  ALT  20  /  AlkPhos  116  01-16      PT/INR - ( 16 Jan 2024 11:02 )   PT: 12.7 sec;   INR: 1.15 ratio         PTT - ( 16 Jan 2024 11:02 )  PTT:37.2 sec    Lipid panel: pending     HgbA1c: pending     RADIOLOGY & ADDITIONAL STUDIES (independently reviewed unless otherwise noted):  CT Brain/CTP Stroke Protocol (01.16.24 @ 11:08)   IMPRESSION:  HEAD CT: IV contrast present from recent CTA. Mild cortical enhancement   right frontal parietal junction corresponding to an area of ischemia seen   on CT perfusion. No large intracranial hemorrhage.  CT PERFUSION: Moderate right parietal ischemia of 31 mL. No core infarct.    As per mobile stroke unit: -" non-con head neg and CTA demonstrating right M2 occlusion            Preliminary note, official note pending attending review/signature.                               [facility] Stroke Team  CC:  HPI:     PAST MEDICAL & SURGICAL HISTORY:  Hypertension  Hypercholesterolemia  No significant past surgical history  MEDICATIONS  (STANDING):  atorvastatin 80 milliGRAM(s) Oral at bedtime  heparin   Injectable 5000 Unit(s) SubCutaneous every 12 hours  pantoprazole    Tablet 40 milliGRAM(s) Oral before breakfast      Allergies-No Known Allergies    SOCIAL HISTORY:  no tob,   no alcohol   no drugs    FAMILY HISTORY:  denies any fam stroke hx     ROS: 14 point ROS negative other than what is present in HPI or below    Vital Signs Last 24 Hrs  T(C): 36.5 (16 Jan 2024 12:50), Max: 36.6 (16 Jan 2024 11:46)  T(F): 97.7 (16 Jan 2024 12:50), Max: 97.8 (16 Jan 2024 11:46)  HR: 65 (16 Jan 2024 14:05) (61 - 93)  BP: 159/70 (16 Jan 2024 14:05) (126/59 - 169/73)  BP(mean): --  RR: 18 (16 Jan 2024 14:05) (16 - 18)  SpO2: 98% (16 Jan 2024 14:05) (98% - 100%)    Parameters below as of 16 Jan 2024 14:05  Patient On (Oxygen Delivery Method): room air    General: NAD    Detailed Neurologic Exam:    Mental status: The patient is awake and alert and has normal attention span.  The patient is fully oriented in 3 spheres. The patient is oriented to current events. The patient is able to name objects, follow commands, repeat sentences.    Cranial nerves: Right facial asymmetry. Pupils equal and react symmetrically to light. There is no visual field deficit to confrontation. Extraocular motion is full with no nystagmus. There is no ptosis. Facial sensation is intact. Tongue is midline.    Motor: There is normal bulk and tone.  There is no tremor.  Strength is 5/5 in the right arm. No drift   RLE: Strength 5/5 with drift that does not hit the bed within 10 sec   Strength is 5/5 in the left arm and leg.    Sensation: Intact to light touch and pin in 4 extremities. no extinction.     Cerebellar: There is no dysmetria on finger to nose testing.    Gait : deferred    NIH SS: 2  DATE: 1/16/24  TIME: 11:00 am   1A: Level of consciousness (0-3): 0  1B: Questions (0-2): 0  1C: Commands (0-2): 0  2: Gaze (0-2): 0  3: Visual fields (0-3): 0  4: Facial palsy (0-3): 1 (possibly baseline as per patient)  MOTOR:  5A: Left arm motor drift (0-4): 0  5B: Right arm motor drift (0-4): 0  6A: Left leg motor drift (0-4): 0  6B: Right leg motor drift (0-4): 1  7: Limb ataxia (0-2): 0  SENSORY:  8: Sensation (0-2): 0  SPEECH:  9: Language (0-3): 0  10: Dysarthria (0-2): 0  EXTINCTION:  11: Extinction/inattention (0-2): 0    TOTAL SCORE: 2    prehospital mRS=0      LABS:                         13.2   7.83  )-----------( 188      ( 16 Jan 2024 11:02 )             39.0       01-16    133<L>  |  94<L>  |  9.4  ----------------------------<  103<H>  3.6   |  26.0  |  0.67    Ca    9.0      16 Jan 2024 11:02    TPro  8.0  /  Alb  3.9  /  TBili  0.5  /  DBili  x   /  AST  30  /  ALT  20  /  AlkPhos  116  01-16      PT/INR - ( 16 Jan 2024 11:02 )   PT: 12.7 sec;   INR: 1.15 ratio         PTT - ( 16 Jan 2024 11:02 )  PTT:37.2 sec    Lipid panel: pending     HgbA1c: pending     RADIOLOGY & ADDITIONAL STUDIES (independently reviewed unless otherwise noted):  CT Brain/CTP Stroke Protocol (01.16.24 @ 11:08)   IMPRESSION:  HEAD CT: IV contrast present from recent CTA. Mild cortical enhancement   right frontal parietal junction corresponding to an area of ischemia seen   on CT perfusion. No large intracranial hemorrhage.  CT PERFUSION: Moderate right parietal ischemia of 31 mL. No core infarct.    As per mobile stroke unit: -" non-con head neg and CTA demonstrating right M2 occlusion            Preliminary note, official note pending attending review/signature.                               [facility] Stroke Team  CC: left sided weakness? upon stroke eval right hand numbness   HPI: Patient is a 63 year old female with PMHx of HTN,HLD who presented to [facility] on 1/16/24 with as per ED note left sided weakness and slurred speech. As per ED note patient was last seen by family 9:00 pm on 1/15/24 upon going to bed. Patient woke up the morning of 1/16/24 at 5:00 am with left sided weakness and slurred speech. [facility] mobile stroke unit transported patient and non-con CTH was negative and CTA performed while en route demonstrated right M2 occlusion. CTP in [facility] ED showed mild cortical enhancement of the  right frontal parietal junction corresponding to an area of ischemia seen Per ED,EMS documented NIHSS score of 8, however upon arrival to [facility] NIHSS was a 2. Stroke team called for consult. On presentation patient complained of right hand numbness and complete resolution of left sided weakness and dysarthria.       PAST MEDICAL & SURGICAL HISTORY:  Hypertension  Hypercholesterolemia  No significant past surgical history  MEDICATIONS  (STANDING):  atorvastatin 80 milliGRAM(s) Oral at bedtime  heparin   Injectable 5000 Unit(s) SubCutaneous every 12 hours  pantoprazole    Tablet 40 milliGRAM(s) Oral before breakfast      Allergies-No Known Allergies    SOCIAL HISTORY:  no tob,   no alcohol   no drugs    FAMILY HISTORY:  denies any fam stroke hx     ROS: 14 point ROS negative other than what is present in HPI or below    Vital Signs Last 24 Hrs  T(C): 36.5 (16 Jan 2024 12:50), Max: 36.6 (16 Jan 2024 11:46)  T(F): 97.7 (16 Jan 2024 12:50), Max: 97.8 (16 Jan 2024 11:46)  HR: 65 (16 Jan 2024 14:05) (61 - 93)  BP: 159/70 (16 Jan 2024 14:05) (126/59 - 169/73)  BP(mean): --  RR: 18 (16 Jan 2024 14:05) (16 - 18)  SpO2: 98% (16 Jan 2024 14:05) (98% - 100%)    Parameters below as of 16 Jan 2024 14:05  Patient On (Oxygen Delivery Method): room air    General: NAD    Detailed Neurologic Exam:    Mental status: The patient is awake and alert and has normal attention span.  The patient is fully oriented in 3 spheres. The patient is oriented to current events. The patient is able to name objects, follow commands, repeat sentences.    Cranial nerves: Right facial asymmetry. Pupils equal and react symmetrically to light. There is no visual field deficit to confrontation. Extraocular motion is full with no nystagmus. There is no ptosis. Facial sensation is intact. Tongue is midline.    Motor: There is normal bulk and tone.  There is no tremor.  Strength is 5/5 in the right arm. No drift   RLE: Strength 5/5 with drift that does not hit the bed within 10 sec   Strength is 5/5 in the left arm and leg.    Sensation: Intact to light touch and pin in 4 extremities. no extinction.     Cerebellar: There is no dysmetria on finger to nose testing.    Gait : deferred    [facility] SS: 2  DATE: 1/16/24  TIME: 11:00 am   1A: Level of consciousness (0-3): 0  1B: Questions (0-2): 0  1C: Commands (0-2): 0  2: Gaze (0-2): 0  3: Visual fields (0-3): 0  4: Facial palsy (0-3): 1 (possibly baseline as per patient)  MOTOR:  5A: Left arm motor drift (0-4): 0  5B: Right arm motor drift (0-4): 0  6A: Left leg motor drift (0-4): 0  6B: Right leg motor drift (0-4): 1  7: Limb ataxia (0-2): 0  SENSORY:  8: Sensation (0-2): 0  SPEECH:  9: Language (0-3): 0  10: Dysarthria (0-2): 0  EXTINCTION:  11: Extinction/inattention (0-2): 0    TOTAL SCORE: 2    prehospital mRS=0      LABS:                         13.2   7.83  )-----------( 188      ( 16 Jan 2024 11:02 )             39.0       01-16    133<L>  |  94<L>  |  9.4  ----------------------------<  103<H>  3.6   |  26.0  |  0.67    Ca    9.0      16 Jan 2024 11:02    TPro  8.0  /  Alb  3.9  /  TBili  0.5  /  DBili  x   /  AST  30  /  ALT  20  /  AlkPhos  116  01-16      PT/INR - ( 16 Jan 2024 11:02 )   PT: 12.7 sec;   INR: 1.15 ratio         PTT - ( 16 Jan 2024 11:02 )  PTT:37.2 sec    Lipid panel: pending     HgbA1c: pending     RADIOLOGY & ADDITIONAL STUDIES (independently reviewed unless otherwise noted):  CT Brain/CTP Stroke Protocol (01.16.24 @ 11:08)   IMPRESSION:  HEAD CT: IV contrast present from recent CTA. Mild cortical enhancement   right frontal parietal junction corresponding to an area of ischemia seen   on CT perfusion. No large intracranial hemorrhage.  CT PERFUSION: Moderate right parietal ischemia of 31 mL. No core infarct.    As per mobile stroke unit: -" non-con head neg and CTA demonstrating right M2 occlusion            Preliminary note, official note pending attending review/signature.                               [facility] Stroke Team  CC: left sided weakness? upon stroke eval right hand numbness   HPI: Patient is a 63 year old female with PMHx of HTN,HLD who presented to [facility] on 1/16/24 with as per ED note left sided weakness and slurred speech. As per ED note patient was last seen by family 9:00 pm on 1/15/24 upon going to bed. Patient woke up the morning of 1/16/24 at 5:00 am with left sided weakness and slurred speech. [facility] mobile stroke unit transported patient and non-con CTH was negative and CTA performed while en route demonstrated right M2 occlusion. CTP in [facility] ED showed mild cortical enhancement of the  right frontal parietal junction corresponding to an area of ischemia seen Per ED,EMS documented NIHSS score of 8, however upon arrival to [facility] NIHSS was a 2. Stroke team called for consult. On presentation patient complained of right hand numbness and complete resolution of left sided weakness and dysarthria.       PAST MEDICAL & SURGICAL HISTORY:  Hypertension  Hypercholesterolemia  No significant past surgical history  MEDICATIONS  (STANDING):  atorvastatin 80 milliGRAM(s) Oral at bedtime  heparin   Injectable 5000 Unit(s) SubCutaneous every 12 hours  pantoprazole    Tablet 40 milliGRAM(s) Oral before breakfast      Allergies-No Known Allergies    SOCIAL HISTORY:  no tob,   no alcohol   no drugs    FAMILY HISTORY:  denies any fam stroke hx     ROS: 14 point ROS negative other than what is present in HPI or below    Vital Signs Last 24 Hrs  T(C): 36.5 (16 Jan 2024 12:50), Max: 36.6 (16 Jan 2024 11:46)  T(F): 97.7 (16 Jan 2024 12:50), Max: 97.8 (16 Jan 2024 11:46)  HR: 65 (16 Jan 2024 14:05) (61 - 93)  BP: 159/70 (16 Jan 2024 14:05) (126/59 - 169/73)  BP(mean): --  RR: 18 (16 Jan 2024 14:05) (16 - 18)  SpO2: 98% (16 Jan 2024 14:05) (98% - 100%)    Parameters below as of 16 Jan 2024 14:05  Patient On (Oxygen Delivery Method): room air    General: NAD    Detailed Neurologic Exam:    Mental status: The patient is awake and alert and has normal attention span.  The patient is fully oriented in 3 spheres. The patient is oriented to current events. The patient is able to name objects, follow commands, repeat sentences.    Cranial nerves: Right facial asymmetry. Pupils equal and react symmetrically to light. There is no visual field deficit to confrontation. Extraocular motion is full with no nystagmus. There is no ptosis. Facial sensation is intact. Tongue is midline.    Motor: There is normal bulk and tone.  There is no tremor.  Strength is 5/5 in the right arm. No drift   RLE: Strength 5/5 with drift that does not hit the bed within 10 sec   Strength is 5/5 in the left arm and leg.    Sensation: Intact to light touch and pin in 4 extremities. no extinction.     Cerebellar: There is no dysmetria on finger to nose testing.    Gait : deferred    [facility] SS: 2  DATE: 1/16/24  TIME: 11:00 am   1A: Level of consciousness (0-3): 0  1B: Questions (0-2): 0  1C: Commands (0-2): 0  2: Gaze (0-2): 0  3: Visual fields (0-3): 0  4: Facial palsy (0-3): 1 (possibly baseline as per patient)  MOTOR:  5A: Left arm motor drift (0-4): 0  5B: Right arm motor drift (0-4): 0  6A: Left leg motor drift (0-4): 0  6B: Right leg motor drift (0-4): 1  7: Limb ataxia (0-2): 0  SENSORY:  8: Sensation (0-2): 0  SPEECH:  9: Language (0-3): 0  10: Dysarthria (0-2): 0  EXTINCTION:  11: Extinction/inattention (0-2): 0    TOTAL SCORE: 2    prehospital mRS=0      LABS:                         13.2   7.83  )-----------( 188      ( 16 Jan 2024 11:02 )             39.0       01-16    133<L>  |  94<L>  |  9.4  ----------------------------<  103<H>  3.6   |  26.0  |  0.67    Ca    9.0      16 Jan 2024 11:02    TPro  8.0  /  Alb  3.9  /  TBili  0.5  /  DBili  x   /  AST  30  /  ALT  20  /  AlkPhos  116  01-16      PT/INR - ( 16 Jan 2024 11:02 )   PT: 12.7 sec;   INR: 1.15 ratio         PTT - ( 16 Jan 2024 11:02 )  PTT:37.2 sec    Lipid panel: pending     HgbA1c: pending     RADIOLOGY & ADDITIONAL STUDIES (independently reviewed unless otherwise noted):  CT Brain/CTP Stroke Protocol (01.16.24 @ 11:08)   IMPRESSION:  HEAD CT: IV contrast present from recent CTA. Mild cortical enhancement   right frontal parietal junction corresponding to an area of ischemia seen   on CT perfusion. No large intracranial hemorrhage.  CT PERFUSION: Moderate right parietal ischemia of 31 mL. No core infarct.    As per mobile stroke unit: -" non-con head neg and CTA demonstrating right M2 occlusion                                         [facility] Stroke Team  CC: left sided weakness? upon stroke eval right hand numbness   HPI: Patient is a 63 year old female with PMHx of HTN,HLD who presented to [facility] on 1/16/24 with as per ED note left sided weakness and slurred speech. As per ED note patient was last seen by family 9:00 pm on 1/15/24 upon going to bed. Patient woke up the morning of 1/16/24 at 5:00 am with left sided weakness and slurred speech. [facility] mobile stroke unit transported patient and non-con CTH was negative and CTA performed while en route demonstrated right M2 occlusion. CTP in [facility] ED showed mild cortical enhancement of the  right frontal parietal junction corresponding to an area of ischemia seen Per ED,EMS documented NIHSS score of 8, however upon arrival to [facility] NIHSS was a 2. Stroke team called for consult. On presentation patient complained of right hand numbness and complete resolution of left sided weakness and dysarthria.       PAST MEDICAL & SURGICAL HISTORY:  Hypertension  Hypercholesterolemia  No significant past surgical history  MEDICATIONS  (STANDING):  atorvastatin 80 milliGRAM(s) Oral at bedtime  heparin   Injectable 5000 Unit(s) SubCutaneous every 12 hours  pantoprazole    Tablet 40 milliGRAM(s) Oral before breakfast      Allergies-No Known Allergies    SOCIAL HISTORY:  no tob,   no alcohol   no drugs    FAMILY HISTORY:  denies any fam stroke hx     ROS: 14 point ROS negative other than what is present in HPI or below    Vital Signs Last 24 Hrs  T(C): 36.5 (16 Jan 2024 12:50), Max: 36.6 (16 Jan 2024 11:46)  T(F): 97.7 (16 Jan 2024 12:50), Max: 97.8 (16 Jan 2024 11:46)  HR: 65 (16 Jan 2024 14:05) (61 - 93)  BP: 159/70 (16 Jan 2024 14:05) (126/59 - 169/73)  BP(mean): --  RR: 18 (16 Jan 2024 14:05) (16 - 18)  SpO2: 98% (16 Jan 2024 14:05) (98% - 100%)    Parameters below as of 16 Jan 2024 14:05  Patient On (Oxygen Delivery Method): room air    General: NAD    Detailed Neurologic Exam:    Mental status: The patient is awake and alert and has normal attention span.  The patient is fully oriented in 3 spheres. The patient is oriented to current events. The patient is able to name objects, follow commands, repeat sentences.    Cranial nerves: Right facial asymmetry. Pupils equal and react symmetrically to light. There is no visual field deficit to confrontation. Extraocular motion is full with no nystagmus. There is no ptosis. Facial sensation is intact. Tongue is midline.    Motor: There is normal bulk and tone.  There is no tremor.  Strength is 5/5 in the right arm. No drift   RLE: Strength 5/5 with drift that does not hit the bed within 10 sec   Strength is 5/5 in the left arm and leg.    Sensation: Intact to light touch and pin in 4 extremities. no extinction.     Cerebellar: There is no dysmetria on finger to nose testing.    Gait : deferred    [facility] SS: 2  DATE: 1/16/24  TIME: 11:00 am   1A: Level of consciousness (0-3): 0  1B: Questions (0-2): 0  1C: Commands (0-2): 0  2: Gaze (0-2): 0  3: Visual fields (0-3): 0  4: Facial palsy (0-3): 1 (possibly baseline as per patient)  MOTOR:  5A: Left arm motor drift (0-4): 0  5B: Right arm motor drift (0-4): 0  6A: Left leg motor drift (0-4): 0  6B: Right leg motor drift (0-4): 1  7: Limb ataxia (0-2): 0  SENSORY:  8: Sensation (0-2): 0  SPEECH:  9: Language (0-3): 0  10: Dysarthria (0-2): 0  EXTINCTION:  11: Extinction/inattention (0-2): 0    TOTAL SCORE: 2    prehospital mRS=0      LABS:                         13.2   7.83  )-----------( 188      ( 16 Jan 2024 11:02 )             39.0       01-16    133<L>  |  94<L>  |  9.4  ----------------------------<  103<H>  3.6   |  26.0  |  0.67    Ca    9.0      16 Jan 2024 11:02    TPro  8.0  /  Alb  3.9  /  TBili  0.5  /  DBili  x   /  AST  30  /  ALT  20  /  AlkPhos  116  01-16      PT/INR - ( 16 Jan 2024 11:02 )   PT: 12.7 sec;   INR: 1.15 ratio         PTT - ( 16 Jan 2024 11:02 )  PTT:37.2 sec    Lipid panel: pending     HgbA1c: pending     RADIOLOGY & ADDITIONAL STUDIES (independently reviewed unless otherwise noted):  CT Brain/CTP Stroke Protocol (01.16.24 @ 11:08)   IMPRESSION:  HEAD CT: IV contrast present from recent CTA. Mild cortical enhancement   right frontal parietal junction corresponding to an area of ischemia seen   on CT perfusion. No large intracranial hemorrhage.  CT PERFUSION: Moderate right parietal ischemia of 31 mL. No core infarct.    As per mobile stroke unit: -" non-con head neg and CTA demonstrating right M2 occlusion                                         [facility] Stroke Team  CC: left sided weakness? upon stroke eval right hand numbness   HPI: Patient is a 63 year old female with PMHx of HTN,HLD who presented to [facility] on 1/16/24 with as per ED note left sided weakness and slurred speech. As per ED note patient was last seen by family 9:00 pm on 1/15/24 upon going to bed. Patient woke up the morning of 1/16/24 at 5:00 am with left sided weakness and slurred speech. [facility] mobile stroke unit transported patient and non-con CTH was negative and CTA performed while en route demonstrated right M2 occlusion. CTP in [facility] ED showed mild cortical enhancement of the  right frontal parietal junction corresponding to an area of ischemia seen Per ED,EMS documented NIHSS score of 8, however upon arrival to [facility] NIHSS was a 2. Stroke team called for consult. On presentation patient complained of right hand numbness and complete resolution of left sided weakness and dysarthria.       PAST MEDICAL & SURGICAL HISTORY:  Hypertension  Hypercholesterolemia  No significant past surgical history  MEDICATIONS  (STANDING):  atorvastatin 80 milliGRAM(s) Oral at bedtime  heparin   Injectable 5000 Unit(s) SubCutaneous every 12 hours  pantoprazole    Tablet 40 milliGRAM(s) Oral before breakfast      Allergies-No Known Allergies    SOCIAL HISTORY:  no tob,   no alcohol   no drugs    FAMILY HISTORY:  denies any fam stroke hx     ROS: 14 point ROS negative other than what is present in HPI or below    Vital Signs Last 24 Hrs  T(C): 36.5 (16 Jan 2024 12:50), Max: 36.6 (16 Jan 2024 11:46)  T(F): 97.7 (16 Jan 2024 12:50), Max: 97.8 (16 Jan 2024 11:46)  HR: 65 (16 Jan 2024 14:05) (61 - 93)  BP: 159/70 (16 Jan 2024 14:05) (126/59 - 169/73)  BP(mean): --  RR: 18 (16 Jan 2024 14:05) (16 - 18)  SpO2: 98% (16 Jan 2024 14:05) (98% - 100%)    Parameters below as of 16 Jan 2024 14:05  Patient On (Oxygen Delivery Method): room air    General: NAD    Detailed Neurologic Exam:    Mental status: The patient is awake and alert and has normal attention span.  The patient is fully oriented in 3 spheres. The patient is oriented to current events. The patient is able to name objects, follow commands, repeat sentences.    Cranial nerves: Right facial asymmetry. Pupils equal and react symmetrically to light. There is no visual field deficit to confrontation. Extraocular motion is full with no nystagmus. There is no ptosis. Facial sensation is intact. Tongue is midline.    Motor: There is normal bulk and tone.  There is no tremor.  Strength is 5/5 in the right arm. No drift   RLE: Strength 5/5 with drift that does not hit the bed within 10 sec   Strength is 5/5 in the left arm and leg.    Sensation: Intact to light touch and pin in 4 extremities. no extinction.     Cerebellar: There is no dysmetria on finger to nose testing.    Gait : deferred    [facility] SS: 2  DATE: 1/16/24  TIME: 11:00 am   1A: Level of consciousness (0-3): 0  1B: Questions (0-2): 0  1C: Commands (0-2): 0  2: Gaze (0-2): 0  3: Visual fields (0-3): 0  4: Facial palsy (0-3): 1 (possibly baseline as per patient)  MOTOR:  5A: Left arm motor drift (0-4): 0  5B: Right arm motor drift (0-4): 0  6A: Left leg motor drift (0-4): 0  6B: Right leg motor drift (0-4): 1  7: Limb ataxia (0-2): 0  SENSORY:  8: Sensation (0-2): 0  SPEECH:  9: Language (0-3): 0  10: Dysarthria (0-2): 0  EXTINCTION:  11: Extinction/inattention (0-2): 0    TOTAL SCORE: 2    prehospital mRS=0      LABS:                         13.2   7.83  )-----------( 188      ( 16 Jan 2024 11:02 )             39.0       01-16    133<L>  |  94<L>  |  9.4  ----------------------------<  103<H>  3.6   |  26.0  |  0.67    Ca    9.0      16 Jan 2024 11:02    TPro  8.0  /  Alb  3.9  /  TBili  0.5  /  DBili  x   /  AST  30  /  ALT  20  /  AlkPhos  116  01-16      PT/INR - ( 16 Jan 2024 11:02 )   PT: 12.7 sec;   INR: 1.15 ratio         PTT - ( 16 Jan 2024 11:02 )  PTT:37.2 sec    Lipid panel: pending     HgbA1c: pending     RADIOLOGY & ADDITIONAL STUDIES (independently reviewed unless otherwise noted):  CT Brain/CTP Stroke Protocol (01.16.24 @ 11:08)   IMPRESSION:  HEAD CT: IV contrast present from recent CTA. Mild cortical enhancement   right frontal parietal junction corresponding to an area of ischemia seen   on CT perfusion. No large intracranial hemorrhage.  CT PERFUSION: Moderate right parietal ischemia of 31 mL. No core infarct.    As per mobile stroke unit: -" non-con head neg and CTA demonstrating right M2 occlusion

## 2024-01-16 NOTE — ED ADULT NURSE NOTE - OBJECTIVE STATEMENT
patient brought in by EMS reporting left sided weakness, left sided numbness, and facial droop that the patient woke up.   Patient reports went to bed at 9 oclock pm  Code stroke activated, MD quarles at bed side for Eval

## 2024-01-16 NOTE — ED PROVIDER NOTE - CLINICAL SUMMARY MEDICAL DECISION MAKING FREE TEXT BOX
Pt with acute L CVA, not a tnk candidate given time and not an intervention candidate. admitted for stroke

## 2024-01-16 NOTE — ED PROVIDER NOTE - OBJECTIVE STATEMENT
63 year old female presents with CVA. Pt LKW was 9 pm last night upon going to bed, she woke up this morning at 5 am with L sided weakness and slurred speech. [facility] mobile stroke unit transported pt, non-con CTH neg, CTA performed while en route demonstrating R M2 occlusion. 63 year old female presents with CVA. Pt LKW was 9 pm last night upon going to bed, she woke up this morning at 5 am with L sided weakness and slurred speech. [facility] mobile stroke unit transported pt, non-con CTH neg, CTA performed while en route demonstrating R M2 occlusion.

## 2024-01-16 NOTE — ED ADULT NURSE REASSESSMENT NOTE - NS ED NURSE REASSESS COMMENT FT1
Gave report to Lucía in cdu, pt a&ox4, NIH 2 for left arm/leg drift, telemetry box in place, VSS, respirations even and unlabored on room air, no distress noted.

## 2024-01-17 DIAGNOSIS — I63.9 CEREBRAL INFARCTION, UNSPECIFIED: ICD-10-CM

## 2024-01-17 PROBLEM — Z00.00 ENCOUNTER FOR PREVENTIVE HEALTH EXAMINATION: Status: ACTIVE | Noted: 2024-01-17

## 2024-01-17 LAB
A1C WITH ESTIMATED AVERAGE GLUCOSE RESULT: 5.5 % — SIGNIFICANT CHANGE UP (ref 4–5.6)
ALBUMIN SERPL ELPH-MCNC: 3.6 G/DL — SIGNIFICANT CHANGE UP (ref 3.3–5.2)
ALP SERPL-CCNC: 101 U/L — SIGNIFICANT CHANGE UP (ref 40–120)
ALT FLD-CCNC: 18 U/L — SIGNIFICANT CHANGE UP
ANION GAP SERPL CALC-SCNC: 14 MMOL/L — SIGNIFICANT CHANGE UP (ref 5–17)
ANION GAP SERPL CALC-SCNC: 15 MMOL/L — SIGNIFICANT CHANGE UP (ref 5–17)
AST SERPL-CCNC: 23 U/L — SIGNIFICANT CHANGE UP
BILIRUB SERPL-MCNC: 0.5 MG/DL — SIGNIFICANT CHANGE UP (ref 0.4–2)
BUN SERPL-MCNC: 8.1 MG/DL — SIGNIFICANT CHANGE UP (ref 8–20)
BUN SERPL-MCNC: 8.3 MG/DL — SIGNIFICANT CHANGE UP (ref 8–20)
CALCIUM SERPL-MCNC: 8.5 MG/DL — SIGNIFICANT CHANGE UP (ref 8.4–10.5)
CALCIUM SERPL-MCNC: 8.6 MG/DL — SIGNIFICANT CHANGE UP (ref 8.4–10.5)
CHLORIDE SERPL-SCNC: 101 MMOL/L — SIGNIFICANT CHANGE UP (ref 96–108)
CHLORIDE SERPL-SCNC: 101 MMOL/L — SIGNIFICANT CHANGE UP (ref 96–108)
CHOLEST SERPL-MCNC: 100 MG/DL — SIGNIFICANT CHANGE UP
CO2 SERPL-SCNC: 25 MMOL/L — SIGNIFICANT CHANGE UP (ref 22–29)
CO2 SERPL-SCNC: 25 MMOL/L — SIGNIFICANT CHANGE UP (ref 22–29)
CREAT SERPL-MCNC: 0.67 MG/DL — SIGNIFICANT CHANGE UP (ref 0.5–1.3)
CREAT SERPL-MCNC: 0.67 MG/DL — SIGNIFICANT CHANGE UP (ref 0.5–1.3)
EGFR: 97 ML/MIN/1.73M2 — SIGNIFICANT CHANGE UP
EGFR: 97 ML/MIN/1.73M2 — SIGNIFICANT CHANGE UP
ESTIMATED AVERAGE GLUCOSE: 111 MG/DL — SIGNIFICANT CHANGE UP (ref 68–114)
GLUCOSE BLDC GLUCOMTR-MCNC: 79 MG/DL — SIGNIFICANT CHANGE UP (ref 70–99)
GLUCOSE BLDC GLUCOMTR-MCNC: 81 MG/DL — SIGNIFICANT CHANGE UP (ref 70–99)
GLUCOSE SERPL-MCNC: 86 MG/DL — SIGNIFICANT CHANGE UP (ref 70–99)
GLUCOSE SERPL-MCNC: 88 MG/DL — SIGNIFICANT CHANGE UP (ref 70–99)
HCT VFR BLD CALC: 35.9 % — SIGNIFICANT CHANGE UP (ref 34.5–45)
HCV AB S/CO SERPL IA: 0.14 S/CO — SIGNIFICANT CHANGE UP (ref 0–0.99)
HCV AB SERPL-IMP: SIGNIFICANT CHANGE UP
HDLC SERPL-MCNC: 44 MG/DL — LOW
HGB BLD-MCNC: 12.5 G/DL — SIGNIFICANT CHANGE UP (ref 11.5–15.5)
LIPID PNL WITH DIRECT LDL SERPL: 44 MG/DL — SIGNIFICANT CHANGE UP
MAGNESIUM SERPL-MCNC: 1.8 MG/DL — SIGNIFICANT CHANGE UP (ref 1.6–2.6)
MCHC RBC-ENTMCNC: 30.6 PG — SIGNIFICANT CHANGE UP (ref 27–34)
MCHC RBC-ENTMCNC: 34.8 GM/DL — SIGNIFICANT CHANGE UP (ref 32–36)
MCV RBC AUTO: 88 FL — SIGNIFICANT CHANGE UP (ref 80–100)
NON HDL CHOLESTEROL: 56 MG/DL — SIGNIFICANT CHANGE UP
PHOSPHATE SERPL-MCNC: 3.8 MG/DL — SIGNIFICANT CHANGE UP (ref 2.4–4.7)
PLATELET # BLD AUTO: 173 K/UL — SIGNIFICANT CHANGE UP (ref 150–400)
POTASSIUM SERPL-MCNC: 3.2 MMOL/L — LOW (ref 3.5–5.3)
POTASSIUM SERPL-MCNC: 3.2 MMOL/L — LOW (ref 3.5–5.3)
POTASSIUM SERPL-SCNC: 3.2 MMOL/L — LOW (ref 3.5–5.3)
POTASSIUM SERPL-SCNC: 3.2 MMOL/L — LOW (ref 3.5–5.3)
PROT SERPL-MCNC: 7 G/DL — SIGNIFICANT CHANGE UP (ref 6.6–8.7)
RBC # BLD: 4.08 M/UL — SIGNIFICANT CHANGE UP (ref 3.8–5.2)
RBC # FLD: 13.1 % — SIGNIFICANT CHANGE UP (ref 10.3–14.5)
SODIUM SERPL-SCNC: 140 MMOL/L — SIGNIFICANT CHANGE UP (ref 135–145)
SODIUM SERPL-SCNC: 141 MMOL/L — SIGNIFICANT CHANGE UP (ref 135–145)
TRIGL SERPL-MCNC: 61 MG/DL — SIGNIFICANT CHANGE UP
TSH SERPL-MCNC: 1.43 UIU/ML — SIGNIFICANT CHANGE UP (ref 0.27–4.2)
WBC # BLD: 6.7 K/UL — SIGNIFICANT CHANGE UP (ref 3.8–10.5)
WBC # FLD AUTO: 6.7 K/UL — SIGNIFICANT CHANGE UP (ref 3.8–10.5)

## 2024-01-17 PROCEDURE — 99233 SBSQ HOSP IP/OBS HIGH 50: CPT | Mod: FS

## 2024-01-17 PROCEDURE — 99232 SBSQ HOSP IP/OBS MODERATE 35: CPT

## 2024-01-17 PROCEDURE — 70450 CT HEAD/BRAIN W/O DYE: CPT | Mod: 26

## 2024-01-17 PROCEDURE — 93306 TTE W/DOPPLER COMPLETE: CPT | Mod: 26

## 2024-01-17 PROCEDURE — 99291 CRITICAL CARE FIRST HOUR: CPT

## 2024-01-17 PROCEDURE — 70551 MRI BRAIN STEM W/O DYE: CPT | Mod: 26

## 2024-01-17 PROCEDURE — 93970 EXTREMITY STUDY: CPT | Mod: 26

## 2024-01-17 RX ORDER — ACETAMINOPHEN 500 MG
650 TABLET ORAL EVERY 6 HOURS
Refills: 0 | Status: DISCONTINUED | OUTPATIENT
Start: 2024-01-17 | End: 2024-01-20

## 2024-01-17 RX ORDER — ENOXAPARIN SODIUM 100 MG/ML
40 INJECTION SUBCUTANEOUS EVERY 24 HOURS
Refills: 0 | Status: DISCONTINUED | OUTPATIENT
Start: 2024-01-17 | End: 2024-01-17

## 2024-01-17 RX ORDER — SODIUM CHLORIDE 9 MG/ML
1000 INJECTION INTRAMUSCULAR; INTRAVENOUS; SUBCUTANEOUS ONCE
Refills: 0 | Status: COMPLETED | OUTPATIENT
Start: 2024-01-17 | End: 2024-01-17

## 2024-01-17 RX ORDER — MAGNESIUM SULFATE 500 MG/ML
2 VIAL (ML) INJECTION ONCE
Refills: 0 | Status: COMPLETED | OUTPATIENT
Start: 2024-01-17 | End: 2024-01-17

## 2024-01-17 RX ORDER — POTASSIUM CHLORIDE 20 MEQ
10 PACKET (EA) ORAL
Refills: 0 | Status: DISCONTINUED | OUTPATIENT
Start: 2024-01-17 | End: 2024-01-17

## 2024-01-17 RX ORDER — SENNA PLUS 8.6 MG/1
2 TABLET ORAL AT BEDTIME
Refills: 0 | Status: DISCONTINUED | OUTPATIENT
Start: 2024-01-17 | End: 2024-01-20

## 2024-01-17 RX ORDER — POLYETHYLENE GLYCOL 3350 17 G/17G
17 POWDER, FOR SOLUTION ORAL DAILY
Refills: 0 | Status: DISCONTINUED | OUTPATIENT
Start: 2024-01-17 | End: 2024-01-20

## 2024-01-17 RX ORDER — ATORVASTATIN CALCIUM 80 MG/1
20 TABLET, FILM COATED ORAL AT BEDTIME
Refills: 0 | Status: DISCONTINUED | OUTPATIENT
Start: 2024-01-17 | End: 2024-01-20

## 2024-01-17 RX ADMIN — SENNA PLUS 2 TABLET(S): 8.6 TABLET ORAL at 22:48

## 2024-01-17 RX ADMIN — POLYETHYLENE GLYCOL 3350 17 GRAM(S): 17 POWDER, FOR SOLUTION ORAL at 11:14

## 2024-01-17 RX ADMIN — Medication 100 MILLIEQUIVALENT(S): at 08:14

## 2024-01-17 RX ADMIN — Medication 650 MILLIGRAM(S): at 21:30

## 2024-01-17 RX ADMIN — Medication 100 MILLIEQUIVALENT(S): at 06:40

## 2024-01-17 RX ADMIN — Medication 1000 MILLIGRAM(S): at 00:15

## 2024-01-17 RX ADMIN — Medication 25 GRAM(S): at 05:44

## 2024-01-17 RX ADMIN — Medication 100 MILLIEQUIVALENT(S): at 10:08

## 2024-01-17 RX ADMIN — Medication 650 MILLIGRAM(S): at 20:30

## 2024-01-17 RX ADMIN — Medication 100 MILLIEQUIVALENT(S): at 05:47

## 2024-01-17 RX ADMIN — ATORVASTATIN CALCIUM 20 MILLIGRAM(S): 80 TABLET, FILM COATED ORAL at 22:48

## 2024-01-17 RX ADMIN — SODIUM CHLORIDE 1000 MILLILITER(S): 9 INJECTION INTRAMUSCULAR; INTRAVENOUS; SUBCUTANEOUS at 04:35

## 2024-01-17 NOTE — CONSULT NOTE ADULT - ASSESSMENT
63 year old female with PMH of HTN,HLP presents with c/o Lt sided weakness, with slurrey speech. Pt  was  last seen by family 9 pm last night upon going to bed, she woke up this morning at 5 am with L sided weakness and slurred speech. [facility] mobile stroke unit transported pt, non-con CTH neg, CTA performed while en route demonstrating R M2 occlusion. CT head/perfusion scan ED showed Mild cortical enhancement right frontal parietal junction corresponding to an area of ischemia seen  .CT PERFUSION: Moderate right parietal ischemia . No core infarct..Per ED,EMS documented NIH score 8,on arrival 2 ,mild rt facial droop/lt sided weakness which is resolved now.  Exam in neuro ICU,  provided, patient feeling well, denies headache, dizziness, chest pain, palpitations, sob, pnd, orthopnea, cough, chest congestion, n/v/d/c/abd pain, cramps, weakness, numbing and tingling, or any other pain.    63 year old female with PMH of HTN,HLP presents with c/o Lt sided weakness, with slurred speech. Pt  was  last seen by family 9 pm last night upon going to bed, she woke up this morning at 5 am with L sided weakness and slurred speech. [facility] mobile stroke unit transported pt, non-con CTH neg, CTA performed while en route demonstrating R M2 occlusion. CT head/perfusion scan ED showed Mild cortical enhancement right frontal parietal junction corresponding to an area of ischemia seen  .CT PERFUSION: Moderate right parietal ischemia . No core infarct..Per ED,EMS documented NIH score 8,on arrival 2 ,mild rt facial droop/lt sided weakness which is resolved now.  Exam in neuro ICU,  provided, patient feeling well, denies headache, dizziness, chest pain, palpitations, sob, pnd, orthopnea, cough, chest congestion, n/v/d/c/abd pain, cramps, weakness, numbing and tingling, or any other pain.

## 2024-01-17 NOTE — CONSULT NOTE ADULT - SUBJECTIVE AND OBJECTIVE BOX
[facility]                                              CARDIOLOGY AT [facility]                                                   [address], [place] [address]                                             Telephone: [phone]. Fax:[phone]                                                       CARDIOLOGY CONSULTATION NOTE                                                                                             History obtained by: Patient and medical record  Community Cardiologist:  Unknown   obtained: Yes [x  ] No [  ]  Reason for Consultation:   Stroke  Available out pt records reviewed: Yes [ x ] No [  ]    Chief complaint:    Patient is a 63y old  Female who presents with a chief complaint of stroke.       HPI:  63 year old female with PMH of HTN,HLP presents with c/o Lt sided weakness, with slurrey speech. Pt  was  last seen by family 9 pm last night upon going to bed, she woke up this morning at 5 am with L sided weakness and slurred speech. [facility] mobile stroke unit transported pt, non-con CTH neg, CTA performed while en route demonstrating R M2 occlusion. CT head/perfusion scan ED showed Mild cortical enhancement right frontal parietal junction corresponding to an area of ischemia seen  .CT PERFUSION: Moderate right parietal ischemia . No core infarct..Per ED,EMS documented NIH score 8,on arrival 2 ,mild rt facial droop/lt sided weakness which is resolved now.  Exam in neuro ICU,  provided, patient feeling well, denies headache, dizziness, chest pain, palpitations, sob, pnd, orthopnea, cough, chest congestion, n/v/d/c/abd pain, cramps, weakness, numbing and tingling, or any other pain.         PMH  HTN,HLP    FH:DM  Social hx: denies smoking /alcohol/illicit drug uses.Lives with brother and Niece    from: CT Brain Perfusion Maps Stroke (01.16.24 @ 11:13) >  IMPRESSION:  HEAD CT: IV contrast present from recent CTA. Mild cortical enhancement   right frontal parietal junction corresponding to an area of ischemia seen   on CT perfusion. No large intracranial hemorrhage.  CT PERFUSION: Moderate right parietal ischemia of 31 mL. No core infarct.  Findings discussed with Dr. Wiggins at 11:19 AM on 1/16/2024    CARDIAC TESTING - uniknown  ECHO:    STRESS:    CATH:     ELECTROPHYSIOLOGY:     PAST MEDICAL HISTORY  Hypertension  Hypercholesterolemia      PAST SURGICAL HISTORY  No significant past surgical history    SOCIAL HISTORY:  Denies smoking/alcohol/drugs  CIGARETTES:     ALCOHOL:  DRUGS:    FAMILY HISTORY:  Family History of Cardiovascular Disease:  Yes [  ] No [ x ]  Coronary Artery Disease in first degree relative: Yes [  ] No [x  ]  Sudden Cardiac Death in First degree relative: Yes [  ] No [x  ]    HOME MEDICATIONS:  losartan: 50 milligram(s) orally once a day (16 Jan 2024 12:29)  simvastatin: 10 milligram(s) orally once a day (16 Jan 2024 12:30)      CURRENT CARDIAC MEDICATIONS:      CURRENT OTHER MEDICATIONS:  acetaminophen     Tablet .. 650 milliGRAM(s) Oral every 6 hours PRN Temp greater or equal to 38C (100.4F), Mild Pain (1 - 3)  polyethylene glycol 3350 17 Gram(s) Oral daily  senna 2 Tablet(s) Oral at bedtime  atorvastatin 20 milliGRAM(s) Oral at bedtime  dextrose 50% Injectable 12.5 Gram(s) IV Push once, Stop order after: 1 Doses  dextrose 50% Injectable 25 Gram(s) IV Push once, Stop order after: 1 Doses  dextrose 50% Injectable 25 Gram(s) IV Push once, Stop order after: 1 Doses  enoxaparin Injectable 40 milliGRAM(s) SubCutaneous every 24 hours      ALLERGIES:   No Known Allergies      REVIEW OF SYMPTOMS:   CONSTITUTIONAL: No fever, no chills, no weight loss, no weight gain, no fatigue   ENMT:  No vertigo; No sinus or throat pain  NECK: No pain or stiffness  CARDIOVASCULAR: No chest pain, no dyspnea, no syncope/presyncope, no palpitations, no dizziness, no Orthopnea, no Paroxsymal nocturnal dyspnea  RESPIRATORY: no Shortness of breath, no cough, no wheezing  : No dysuria, no hematuria   GI: No dark color stool, no nausea, no diarrhea, no constipation, no abdominal pain   NEURO: No headache, no slurred speech   MUSCULOSKELETAL: No joint pain or swelling; No muscle, back, or extremity pain  PSYCH: No agitation, no anxiety.    ALL OTHER REVIEW OF SYSTEMS ARE NEGATIVE.    VITAL SIGNS:  T(C): 36.6 (01-17-24 @ 08:00), Max: 37 (01-16-24 @ 16:05)  T(F): 97.8 (01-17-24 @ 08:00), Max: 98.6 (01-16-24 @ 16:05)  HR: 61 (01-17-24 @ 14:00) (52 - 72)  BP: 124/61 (01-17-24 @ 14:00) (100/40 - 144/67)  RR: 12 (01-17-24 @ 14:00) (12 - 19)  SpO2: 100% (01-17-24 @ 14:00) (95% - 100%)    INTAKE AND OUTPUT:     01-16 @ 07:01  -  01-17 @ 07:00  --------------------------------------------------------  IN: 2050 mL / OUT: 2150 mL / NET: -100 mL    PHYSICAL EXAM:  Constitutional: Comfortable . No acute distress.   HEENT: Atraumatic and normocephalic , neck is supple . no JVD. No carotid bruit.  CNS: A&Ox3. No focal deficits.   Respiratory: CTAB, unlabored   Cardiovascular: RRR normal s1 s2. No murmur. No rubs or gallop.  Gastrointestinal: Soft, non-tender. +Bowel sounds.   Extremities: 2+ Peripheral Pulses, No clubbing, cyanosis, or edema  Psychiatric: Calm . no agitation.   Skin: Warm and dry, no ulcers on extremities     LABS:  ( 16 Jan 2024 11:02 )  Troponin T  X    ,  CPK  61   , CKMB  X    , BNP X                              12.5   6.70  )-----------( 173      ( 17 Jan 2024 04:09 )             35.9     01-17    141  |  101  |  8.1  ----------------------------<  86  3.2<L>   |  25.0  |  0.67    Ca    8.6      17 Jan 2024 04:09  Phos  3.8     01-17  Mg     1.8     01-17    TPro  7.0  /  Alb  3.6  /  TBili  0.5  /  DBili  x   /  AST  23  /  ALT  18  /  AlkPhos  101  01-17    PT/INR - ( 16 Jan 2024 11:02 )   PT: 12.7 sec;   INR: 1.15 ratio         PTT - ( 16 Jan 2024 11:02 )  PTT:37.2 sec  Urinalysis Basic - ( 17 Jan 2024 04:09 )    Color: x / Appearance: x / SG: x / pH: x  Gluc: 86 mg/dL / Ketone: x  / Bili: x / Urobili: x   Blood: x / Protein: x / Nitrite: x   Leuk Esterase: x / RBC: x / WBC x   Sq Epi: x / Non Sq Epi: x / Bacteria: x      01-17-24 @ 04:09  CHolesterol: 100,  HDL: 44,  LDL: 44, Triglycerides: 61       Thyroid Stimulating Hormone, Serum: 1.43 uIU/mL (01-17-24 @ 04:09)      INTERPRETATION OF TELEMETRY:   St/SR no acute alarms noted.      ECG:     Ventricular Rate 62 BPM  Atrial Rate 62 BPM  P-R Interval 112 ms  QRS Duration 86 ms  Q-T Interval 456 ms  QTC Calculation(Bazett) 462 ms  P Axis 17 degrees  R Axis 43 degrees  T Axis 135 degrees  Diagnosis Line *** Poor data quality, interpretation may be adversely affected  Normal sinus rhythm  Possible Left atrial enlargement  Septal infarct , age undetermined  ST & T wave abnormality, consider anterolateral ischemia  Abnormal ECG    Prior ECG: Yes [  ] No [x  ]                                                  [facility]                                              CARDIOLOGY AT [facility]                                                   [address], [place] [address]                                             Telephone: [phone]. Fax:[phone]                                                       CARDIOLOGY CONSULTATION NOTE                                                                                             History obtained by: Patient and medical record  Community Cardiologist:  Unknown   obtained: Yes [x  ] No [  ]  Reason for Consultation:   Stroke  Available out pt records reviewed: Yes [ x ] No [  ]    Chief complaint:    Patient is a 63y old  Female who presents with a chief complaint of stroke.       HPI:  63 year old female with PMH of HTN,HLP presents with c/o Lt sided weakness, with slurred speech. Pt  was  last seen by family 9 pm last night upon going to bed, she woke up this morning at 5 am with L sided weakness and slurred speech. [facility] mobile stroke unit transported pt, non-con CTH neg, CTA performed while en route demonstrating R M2 occlusion. CT head/perfusion scan ED showed Mild cortical enhancement right frontal parietal junction corresponding to an area of ischemia seen  .CT PERFUSION: Moderate right parietal ischemia . No core infarct..Per ED,EMS documented NIH score 8,on arrival 2 ,mild rt facial droop/lt sided weakness which is resolved now.  Exam in neuro ICU,  provided, patient feeling well, denies headache, dizziness, chest pain, palpitations, sob, pnd, orthopnea, cough, chest congestion, n/v/d/c/abd pain, cramps, weakness, numbing and tingling, or any other pain.         PMH  HTN,HLP    FH:DM  Social hx: denies smoking /alcohol/illicit drug uses.Lives with brother and Niece    from: CT Brain Perfusion Maps Stroke (01.16.24 @ 11:13) >  IMPRESSION:  HEAD CT: IV contrast present from recent CTA. Mild cortical enhancement   right frontal parietal junction corresponding to an area of ischemia seen   on CT perfusion. No large intracranial hemorrhage.  CT PERFUSION: Moderate right parietal ischemia of 31 mL. No core infarct.  Findings discussed with Dr. Wiggins at 11:19 AM on 1/16/2024    CARDIAC TESTING - uniknown  ECHO:    STRESS:    CATH:     ELECTROPHYSIOLOGY:     PAST MEDICAL HISTORY  Hypertension  Hypercholesterolemia      PAST SURGICAL HISTORY  No significant past surgical history    SOCIAL HISTORY:  Denies smoking/alcohol/drugs  CIGARETTES:     ALCOHOL:  DRUGS:    FAMILY HISTORY:  Family History of Cardiovascular Disease:  Yes [  ] No [ x ]  Coronary Artery Disease in first degree relative: Yes [  ] No [x  ]  Sudden Cardiac Death in First degree relative: Yes [  ] No [x  ]    HOME MEDICATIONS:  losartan: 50 milligram(s) orally once a day (16 Jan 2024 12:29)  simvastatin: 10 milligram(s) orally once a day (16 Jan 2024 12:30)      CURRENT CARDIAC MEDICATIONS:      CURRENT OTHER MEDICATIONS:  acetaminophen     Tablet .. 650 milliGRAM(s) Oral every 6 hours PRN Temp greater or equal to 38C (100.4F), Mild Pain (1 - 3)  polyethylene glycol 3350 17 Gram(s) Oral daily  senna 2 Tablet(s) Oral at bedtime  atorvastatin 20 milliGRAM(s) Oral at bedtime  dextrose 50% Injectable 12.5 Gram(s) IV Push once, Stop order after: 1 Doses  dextrose 50% Injectable 25 Gram(s) IV Push once, Stop order after: 1 Doses  dextrose 50% Injectable 25 Gram(s) IV Push once, Stop order after: 1 Doses  enoxaparin Injectable 40 milliGRAM(s) SubCutaneous every 24 hours      ALLERGIES:   No Known Allergies      REVIEW OF SYMPTOMS:   CONSTITUTIONAL: No fever, no chills, no weight loss, no weight gain, no fatigue   ENMT:  No vertigo; No sinus or throat pain  NECK: No pain or stiffness  CARDIOVASCULAR: No chest pain, no dyspnea, no syncope/presyncope, no palpitations, no dizziness, no Orthopnea, no Paroxsymal nocturnal dyspnea  RESPIRATORY: no Shortness of breath, no cough, no wheezing  : No dysuria, no hematuria   GI: No dark color stool, no nausea, no diarrhea, no constipation, no abdominal pain   NEURO: No headache, no slurred speech   MUSCULOSKELETAL: No joint pain or swelling; No muscle, back, or extremity pain  PSYCH: No agitation, no anxiety.    ALL OTHER REVIEW OF SYSTEMS ARE NEGATIVE.    VITAL SIGNS:  T(C): 36.6 (01-17-24 @ 08:00), Max: 37 (01-16-24 @ 16:05)  T(F): 97.8 (01-17-24 @ 08:00), Max: 98.6 (01-16-24 @ 16:05)  HR: 61 (01-17-24 @ 14:00) (52 - 72)  BP: 124/61 (01-17-24 @ 14:00) (100/40 - 144/67)  RR: 12 (01-17-24 @ 14:00) (12 - 19)  SpO2: 100% (01-17-24 @ 14:00) (95% - 100%)    INTAKE AND OUTPUT:     01-16 @ 07:01  -  01-17 @ 07:00  --------------------------------------------------------  IN: 2050 mL / OUT: 2150 mL / NET: -100 mL    PHYSICAL EXAM:  Constitutional: Comfortable . No acute distress.   HEENT: Atraumatic and normocephalic , neck is supple . no JVD. No carotid bruit.  CNS: A&Ox3. No focal deficits.   Respiratory: CTAB, unlabored   Cardiovascular: RRR normal s1 s2. No murmur. No rubs or gallop.  Gastrointestinal: Soft, non-tender. +Bowel sounds.   Extremities: 2+ Peripheral Pulses, No clubbing, cyanosis, or edema  Psychiatric: Calm . no agitation.   Skin: Warm and dry, no ulcers on extremities     LABS:  ( 16 Jan 2024 11:02 )  Troponin T  X    ,  CPK  61   , CKMB  X    , BNP X                              12.5   6.70  )-----------( 173      ( 17 Jan 2024 04:09 )             35.9     01-17    141  |  101  |  8.1  ----------------------------<  86  3.2<L>   |  25.0  |  0.67    Ca    8.6      17 Jan 2024 04:09  Phos  3.8     01-17  Mg     1.8     01-17    TPro  7.0  /  Alb  3.6  /  TBili  0.5  /  DBili  x   /  AST  23  /  ALT  18  /  AlkPhos  101  01-17    PT/INR - ( 16 Jan 2024 11:02 )   PT: 12.7 sec;   INR: 1.15 ratio         PTT - ( 16 Jan 2024 11:02 )  PTT:37.2 sec  Urinalysis Basic - ( 17 Jan 2024 04:09 )    Color: x / Appearance: x / SG: x / pH: x  Gluc: 86 mg/dL / Ketone: x  / Bili: x / Urobili: x   Blood: x / Protein: x / Nitrite: x   Leuk Esterase: x / RBC: x / WBC x   Sq Epi: x / Non Sq Epi: x / Bacteria: x      01-17-24 @ 04:09  CHolesterol: 100,  HDL: 44,  LDL: 44, Triglycerides: 61       Thyroid Stimulating Hormone, Serum: 1.43 uIU/mL (01-17-24 @ 04:09)      INTERPRETATION OF TELEMETRY:   St/SR no acute alarms noted.      ECG:     Ventricular Rate 62 BPM  Atrial Rate 62 BPM  P-R Interval 112 ms  QRS Duration 86 ms  Q-T Interval 456 ms  QTC Calculation(Bazett) 462 ms  P Axis 17 degrees  R Axis 43 degrees  T Axis 135 degrees  Diagnosis Line *** Poor data quality, interpretation may be adversely affected  Normal sinus rhythm  Possible Left atrial enlargement  Septal infarct , age undetermined  ST & T wave abnormality, consider anterolateral ischemia  Abnormal ECG    Prior ECG: Yes [  ] No [x  ]

## 2024-01-17 NOTE — CONSULT NOTE ADULT - NS ATTEND AMEND GEN_ALL_CORE FT
62 y/o F admitted with acute CVA (R M2 occlusion per mobile stroke unit, MRI showing small acute cortically based infarcts in R parietal and temporal lobes, R insular cortex with mild hemorrhagic conversion). TTE pending. Plan for CHLOE to evaluate for cardioembolic source. ILR prior to discharge if there are no significant findings on CHLOE and no arrhythmias on telemetry.
Agree with PA's assessment and plan.

## 2024-01-17 NOTE — OCCUPATIONAL THERAPY INITIAL EVALUATION ADULT - GENERAL OBSERVATIONS, REHAB EVAL
Pt is [place] speaking. Use of ipad  for OT evaluation.  name: Benedicto (ID#: [id number]). Left pt as received OOB in recliner, NAD, +IV, +Tele//BP on RA with family beside, pt A&Ox4 however decreased processing speed and response times noted. Pre/post pain 0/10. Pt agreeable to OT evaluation.

## 2024-01-17 NOTE — CHART NOTE - NSCHARTNOTEFT_GEN_A_CORE
HPI:   63 year old female with PMH of HTN,HLP presents with c/o Lt sided weakness, with slurrey speech. Pt  was  last seen by family 9 pm last night upon going to bed, she woke up this morning at 5 am with L sided weakness and slurred speech. [facility] mobile stroke unit transported pt, non-con CTH neg, CTA performed while en route demonstrating R M2 occlusion. CT head/perfusion scan ED showed Mild cortical enhancement   right frontal parietal junction corresponding to an area of ischemia seen.CT PERFUSION: Moderate right parietal ischemia . No core infarct..Per ED,EMS documented NIH score 8,on arrival 2 ,mild rt facial droop/lt sided weekness which is resolved now.Ed spoke with Neurology team.Pt is AAOX3.C/O mild rt sided headache, clear speech, denies any weakness/numbness. Pt reports complained with medicines.    (16 Jan 2024 12:30)    INTERVAL EVENTS: Patient examined at bedside, neuro exam stable. CTH reviewed, started SQL for DVT ppx. Holding ASA 81 for now due to petechial hemorrhages on CTH, pending repeat CTH in AM. Stroke neurology following. Cardiology consulted. Pending b/l LE duplex and echo.    Vital Signs Last 24 Hrs  T(C): 36.6 (17 Jan 2024 08:00), Max: 37 (16 Jan 2024 16:05)  T(F): 97.8 (17 Jan 2024 08:00), Max: 98.6 (16 Jan 2024 16:05)  HR: 55 (17 Jan 2024 10:00) (52 - 93)  BP: 126/59 (17 Jan 2024 10:00) (100/40 - 169/73)  BP(mean): 79 (17 Jan 2024 10:00) (59 - 88)  RR: 12 (17 Jan 2024 10:00) (12 - 18)  SpO2: 98% (17 Jan 2024 10:00) (95% - 100%)    Parameters below as of 17 Jan 2024 08:00  Patient On (Oxygen Delivery Method): room air    I&O's Summary    16 Jan 2024 07:01  -  17 Jan 2024 07:00  --------------------------------------------------------  IN: 2050 mL / OUT: 2150 mL / NET: -100 mL    PHYSICAL EXAM:  General: NAD, pt is comfortably sitting up in bed, on room air  HEENT: EOMI b/l, face symmetric, tongue midline, neck FROM  Cardiovascular: Regular rate and rhythm  Respiratory: nonlabored breathing, normal chest rise  GI: abdomen soft, nontender, nondistended  Neuro: CN II-XII grossly intact, PERRL 3mm briskly reactive   A&Ox3, No aphasia, speech clear, no dysmetria, no pronator drift. Follows commands.  DO x4 spontaneously, 5/5 strength in all extremities throughout.   L sided neglect, decr sensation on L   Extremities: distal pulses 2+ x4      LABS:                        12.5   6.70  )-----------( 173      ( 17 Jan 2024 04:09 )             35.9     01-17    141  |  101  |  8.1  ----------------------------<  86  3.2<L>   |  25.0  |  0.67    Ca    8.6      17 Jan 2024 04:09  Phos  3.8     01-17  Mg     1.8     01-17  TPro  7.0  /  Alb  3.6  /  TBili  0.5  /  DBili  x   /  AST  23  /  ALT  18  /  AlkPhos  101  01-17  PT/INR - ( 16 Jan 2024 11:02 )   PT: 12.7 sec;   INR: 1.15 ratio    PTT - ( 16 Jan 2024 11:02 )  PTT:37.2 sec  Urinalysis Basic - ( 17 Jan 2024 04:09 )  Color: x / Appearance: x / SG: x / pH: x  Gluc: 86 mg/dL / Ketone: x  / Bili: x / Urobili: x   Blood: x / Protein: x / Nitrite: x   Leuk Esterase: x / RBC: x / WBC x   Sq Epi: x / Non Sq Epi: x / Bacteria: x      CARDIAC MARKERS ( 16 Jan 2024 11:02 )  x     / x     / 61 U/L / x     / x          CAPILLARY BLOOD GLUCOSE  POCT Blood Glucose.: 79 mg/dL (17 Jan 2024 08:12)  POCT Blood Glucose.: 92 mg/dL (16 Jan 2024 22:26)  POCT Blood Glucose.: 96 mg/dL (16 Jan 2024 11:00)      Allergies  No Known Allergies    MEDICATIONS:  enoxaparin Injectable 40 milliGRAM(s) SubCutaneous every 24 hours  atorvastatin 20 milliGRAM(s) Oral at bedtime  dextrose 50% Injectable 25 Gram(s) IV Push once  dextrose 50% Injectable 12.5 Gram(s) IV Push once  dextrose 50% Injectable 25 Gram(s) IV Push once  dextrose Oral Gel 15 Gram(s) Oral once PRN  glucagon  Injectable 1 milliGRAM(s) IntraMuscular once  insulin lispro (ADMELOG) corrective regimen sliding scale   SubCutaneous three times a day before meals  insulin lispro (ADMELOG) corrective regimen sliding scale   SubCutaneous at bedtime  pantoprazole    Tablet 40 milliGRAM(s) Oral before breakfast  dextrose 5%. 1000 milliLiter(s) IV Continuous <Continuous>  dextrose 5%. 1000 milliLiter(s) IV Continuous <Continuous>  potassium chloride  10 mEq/100 mL IVPB 10 milliEquivalent(s) IV Intermittent every 1 hour  sodium chloride 0.9%. 1000 milliLiter(s) IV Continuous <Continuous>      RADIOLOGY & ADDITIONAL TESTS:  < from: CT Head No Cont (01.17.24 @ 08:09) >  IMPRESSION:  1.  Foci of poor discrimination of the gray-white matter junction in the   right cerebral hemisphere, compatible with acute infarctions as seen on   prior MRI.  2.  Faint ill-defined hyperattenuating focus about the right frontal lobe   infarction, more conspicuous when compared to prior CT imaging, likely   petechial hemorrhage versus focal hemorrhagic conversion. Recommend   follow-up imaging.      < from: MR Head No Cont (01.17.24 @ 02:08) >  IMPRESSION:  Small acute cortically based infarcts in the right parietal and temporal   lobes as well as the right insular cortex. Mild hemorrhagic conversion   right parietal cortically based infarct. No large intracranial hemorrhage   or vasogenic edema.    < from: US Duplex Carotid Arteries Complete, Bilateral (01.16.24 @ 15:07) >  IMPRESSION: No significant hemodynamic stenosis of either carotid artery.  Measurement of carotid stenosis is based on velocity parameters that   correlate the residual internal carotid diameter with that of the more   distal vessel in accordance with a method such as the North American   Symptomatic Carotid Endarterectomy Trial (NASCET).    < from: CT Brain Perfusion Maps Stroke (01.16.24 @ 11:13) >  IMPRESSION:  HEAD CT: IV contrast present from recent CTA. Mild cortical enhancement   right frontal parietal junction corresponding to an area of ischemia seen   on CT perfusion. No large intracranial hemorrhage.  CT PERFUSION: Moderate right parietal ischemia of 31 mL. No core infarct.      ASSESSMENT:  64 y/o female with PMHx of HTN, HLD presented to ED after found to have R M2 occlusion on mobile stroke unit after c/o L sided weakness and slurred speech after waking up 1/16 5AM, LKW 9PM. Upon arrival to [facility] ED, CTH revealed no large ICH, CTP revealed mild R frontal cortical enhancement, no core infarct with 31mL R parietal penumbra. NIHSS improved from 8 to 2, admitted to NSICU for q1hr neuro checks while in window for thrombectomy.       PLAN:   NEURO:   - neuro checks q4hrs   - CTH in AM   - MRI brain done   - pain control PRN: tylenol   - holding ASA until cleared by stroke neurology   - stroke core measures  - pend PT/OT   - stroke neurology following    CV:    - SBP goal 100-160  - LDL 44   - Atorvastatin 20   - pend echo   - holding home losartan due to softer SBP in NSICU   - cardiology consulted     Resp:   - tolerating RA     GI:     - diet: DASH   - LBM: outpatient   - bowel regimen: Senna, Miralax     :   - IVL, voiding     Heme:    - DVT ppx: SCDs, SQL 40mg   - pend b/l LE duplex      ID:    - no issues      Endo:  - A1C 5.5, ISS   - TSH 1.43     Dispo: Downgraded to medicine telemetry     D/w Dr. Maciel HPI:   63 year old female with PMH of HTN,HLP presents with c/o Lt sided weakness, with slurrey speech. Pt  was  last seen by family 9 pm last night upon going to bed, she woke up this morning at 5 am with L sided weakness and slurred speech. [facility] mobile stroke unit transported pt, non-con CTH neg, CTA performed while en route demonstrating R M2 occlusion. CT head/perfusion scan ED showed Mild cortical enhancement   right frontal parietal junction corresponding to an area of ischemia seen.CT PERFUSION: Moderate right parietal ischemia . No core infarct..Per ED,EMS documented NIH score 8,on arrival 2 ,mild rt facial droop/lt sided weekness which is resolved now.Ed spoke with Neurology team.Pt is AAOX3.C/O mild rt sided headache, clear speech, denies any weakness/numbness. Pt reports complained with medicines.    (16 Jan 2024 12:30)    INTERVAL EVENTS: Patient examined at bedside, neuro exam stable. CTH reviewed, started SQL for DVT ppx. Holding ASA 81 for now due to petechial hemorrhages on CTH, pending repeat CTH in AM. Stroke neurology following. Cardiology consulted. Pending b/l LE duplex and echo.    Vital Signs Last 24 Hrs  T(C): 36.6 (17 Jan 2024 08:00), Max: 37 (16 Jan 2024 16:05)  T(F): 97.8 (17 Jan 2024 08:00), Max: 98.6 (16 Jan 2024 16:05)  HR: 55 (17 Jan 2024 10:00) (52 - 93)  BP: 126/59 (17 Jan 2024 10:00) (100/40 - 169/73)  BP(mean): 79 (17 Jan 2024 10:00) (59 - 88)  RR: 12 (17 Jan 2024 10:00) (12 - 18)  SpO2: 98% (17 Jan 2024 10:00) (95% - 100%)    Parameters below as of 17 Jan 2024 08:00  Patient On (Oxygen Delivery Method): room air    I&O's Summary    16 Jan 2024 07:01  -  17 Jan 2024 07:00  --------------------------------------------------------  IN: 2050 mL / OUT: 2150 mL / NET: -100 mL    PHYSICAL EXAM:  General: NAD, pt is comfortably sitting up in bed, on room air  HEENT: EOMI b/l, face symmetric, tongue midline, neck FROM  Cardiovascular: Regular rate and rhythm  Respiratory: nonlabored breathing, normal chest rise  GI: abdomen soft, nontender, nondistended  Neuro: CN II-XII grossly intact, PERRL 3mm briskly reactive   A&Ox3, No aphasia, speech clear, no dysmetria, no pronator drift. Follows commands.  DO x4 spontaneously, 5/5 strength in all extremities throughout.   L sided neglect, decr sensation on L   Extremities: distal pulses 2+ x4      LABS:                        12.5   6.70  )-----------( 173      ( 17 Jan 2024 04:09 )             35.9     01-17    141  |  101  |  8.1  ----------------------------<  86  3.2<L>   |  25.0  |  0.67    Ca    8.6      17 Jan 2024 04:09  Phos  3.8     01-17  Mg     1.8     01-17  TPro  7.0  /  Alb  3.6  /  TBili  0.5  /  DBili  x   /  AST  23  /  ALT  18  /  AlkPhos  101  01-17  PT/INR - ( 16 Jan 2024 11:02 )   PT: 12.7 sec;   INR: 1.15 ratio    PTT - ( 16 Jan 2024 11:02 )  PTT:37.2 sec  Urinalysis Basic - ( 17 Jan 2024 04:09 )  Color: x / Appearance: x / SG: x / pH: x  Gluc: 86 mg/dL / Ketone: x  / Bili: x / Urobili: x   Blood: x / Protein: x / Nitrite: x   Leuk Esterase: x / RBC: x / WBC x   Sq Epi: x / Non Sq Epi: x / Bacteria: x      CARDIAC MARKERS ( 16 Jan 2024 11:02 )  x     / x     / 61 U/L / x     / x          CAPILLARY BLOOD GLUCOSE  POCT Blood Glucose.: 79 mg/dL (17 Jan 2024 08:12)  POCT Blood Glucose.: 92 mg/dL (16 Jan 2024 22:26)  POCT Blood Glucose.: 96 mg/dL (16 Jan 2024 11:00)      Allergies  No Known Allergies    MEDICATIONS:  enoxaparin Injectable 40 milliGRAM(s) SubCutaneous every 24 hours  atorvastatin 20 milliGRAM(s) Oral at bedtime  dextrose 50% Injectable 25 Gram(s) IV Push once  dextrose 50% Injectable 12.5 Gram(s) IV Push once  dextrose 50% Injectable 25 Gram(s) IV Push once  dextrose Oral Gel 15 Gram(s) Oral once PRN  glucagon  Injectable 1 milliGRAM(s) IntraMuscular once  insulin lispro (ADMELOG) corrective regimen sliding scale   SubCutaneous three times a day before meals  insulin lispro (ADMELOG) corrective regimen sliding scale   SubCutaneous at bedtime  pantoprazole    Tablet 40 milliGRAM(s) Oral before breakfast  dextrose 5%. 1000 milliLiter(s) IV Continuous <Continuous>  dextrose 5%. 1000 milliLiter(s) IV Continuous <Continuous>  potassium chloride  10 mEq/100 mL IVPB 10 milliEquivalent(s) IV Intermittent every 1 hour  sodium chloride 0.9%. 1000 milliLiter(s) IV Continuous <Continuous>      RADIOLOGY & ADDITIONAL TESTS:  < from: CT Head No Cont (01.17.24 @ 08:09) >  IMPRESSION:  1.  Foci of poor discrimination of the gray-white matter junction in the   right cerebral hemisphere, compatible with acute infarctions as seen on   prior MRI.  2.  Faint ill-defined hyperattenuating focus about the right frontal lobe   infarction, more conspicuous when compared to prior CT imaging, likely   petechial hemorrhage versus focal hemorrhagic conversion. Recommend   follow-up imaging.      < from: MR Head No Cont (01.17.24 @ 02:08) >  IMPRESSION:  Small acute cortically based infarcts in the right parietal and temporal   lobes as well as the right insular cortex. Mild hemorrhagic conversion   right parietal cortically based infarct. No large intracranial hemorrhage   or vasogenic edema.    < from: US Duplex Carotid Arteries Complete, Bilateral (01.16.24 @ 15:07) >  IMPRESSION: No significant hemodynamic stenosis of either carotid artery.  Measurement of carotid stenosis is based on velocity parameters that   correlate the residual internal carotid diameter with that of the more   distal vessel in accordance with a method such as the North American   Symptomatic Carotid Endarterectomy Trial (NASCET).    < from: CT Brain Perfusion Maps Stroke (01.16.24 @ 11:13) >  IMPRESSION:  HEAD CT: IV contrast present from recent CTA. Mild cortical enhancement   right frontal parietal junction corresponding to an area of ischemia seen   on CT perfusion. No large intracranial hemorrhage.  CT PERFUSION: Moderate right parietal ischemia of 31 mL. No core infarct.      ASSESSMENT:  64 y/o female with PMHx of HTN, HLD presented to ED after found to have R M2 occlusion on mobile stroke unit after c/o L sided weakness and slurred speech after waking up 1/16 5AM, LKW 9PM. Upon arrival to [facility] ED, CTH revealed no large ICH, CTP revealed mild R frontal cortical enhancement, no core infarct with 31mL R parietal penumbra. NIHSS improved from 8 to 2, admitted to NSICU for q1hr neuro checks while in window for thrombectomy.       PLAN:   NEURO:   - neuro checks q4hrs   - pend CTH in AM   - MRI brain done   - pain control PRN: tylenol   - holding ASA until cleared by stroke neurology   - stroke core measures  - pend PT/OT   - stroke neurology following    CV:    - SBP goal 100-160  - LDL 44   - Atorvastatin 20   - pend echo   - holding home losartan due to softer SBP in NSICU   - cardiology consulted     Resp:   - tolerating RA     GI:     - diet: DASH   - LBM: outpatient   - bowel regimen: Senna, Miralax     :   - IVL, voiding     Heme:    - DVT ppx: SCDs, SQL 40mg   - pend b/l LE duplex      ID:    - no issues      Endo:  - A1C 5.5, ISS   - TSH 1.43     Dispo: Downgraded to medicine telemetry     D/w Dr. Maciel and Dr. Saucedo 1/17 @11:11AM HPI:   63 year old female with PMH of HTN,HLP presents with c/o Lt sided weakness, with slurrey speech. Pt  was  last seen by family 9 pm last night upon going to bed, she woke up this morning at 5 am with L sided weakness and slurred speech. [facility] mobile stroke unit transported pt, non-con CTH neg, CTA performed while en route demonstrating R M2 occlusion. CT head/perfusion scan ED showed Mild cortical enhancement   right frontal parietal junction corresponding to an area of ischemia seen.CT PERFUSION: Moderate right parietal ischemia . No core infarct..Per ED,EMS documented NIH score 8,on arrival 2 ,mild rt facial droop/lt sided weekness which is resolved now.Ed spoke with Neurology team.Pt is AAOX3.C/O mild rt sided headache, clear speech, denies any weakness/numbness. Pt reports complained with medicines.    (16 Jan 2024 12:30)    INTERVAL EVENTS: Patient examined at bedside, neuro exam stable. CTH reviewed, started SQL for DVT ppx. Holding ASA 81 for now due to petechial hemorrhages on CTH, pending repeat CTH in AM. Stroke neurology following. Cardiology consulted. Pending b/l LE duplex and echo.    Vital Signs Last 24 Hrs  T(C): 36.6 (17 Jan 2024 08:00), Max: 37 (16 Jan 2024 16:05)  T(F): 97.8 (17 Jan 2024 08:00), Max: 98.6 (16 Jan 2024 16:05)  HR: 55 (17 Jan 2024 10:00) (52 - 93)  BP: 126/59 (17 Jan 2024 10:00) (100/40 - 169/73)  BP(mean): 79 (17 Jan 2024 10:00) (59 - 88)  RR: 12 (17 Jan 2024 10:00) (12 - 18)  SpO2: 98% (17 Jan 2024 10:00) (95% - 100%)    Parameters below as of 17 Jan 2024 08:00  Patient On (Oxygen Delivery Method): room air    I&O's Summary    16 Jan 2024 07:01  -  17 Jan 2024 07:00  --------------------------------------------------------  IN: 2050 mL / OUT: 2150 mL / NET: -100 mL    PHYSICAL EXAM:  General: NAD, pt is comfortably sitting up in bed, on room air  HEENT: EOMI b/l, face symmetric, tongue midline, neck FROM  Cardiovascular: Regular rate and rhythm  Respiratory: nonlabored breathing, normal chest rise  GI: abdomen soft, nontender, nondistended  Neuro: CN II-XII grossly intact, PERRL 3mm briskly reactive   A&Ox3, No aphasia, speech clear, no dysmetria, no pronator drift. Follows commands.  DO x4 spontaneously, 5/5 strength in all extremities throughout.   L sided neglect, decr sensation on L   Extremities: distal pulses 2+ x4      LABS:                        12.5   6.70  )-----------( 173      ( 17 Jan 2024 04:09 )             35.9     01-17    141  |  101  |  8.1  ----------------------------<  86  3.2<L>   |  25.0  |  0.67    Ca    8.6      17 Jan 2024 04:09  Phos  3.8     01-17  Mg     1.8     01-17  TPro  7.0  /  Alb  3.6  /  TBili  0.5  /  DBili  x   /  AST  23  /  ALT  18  /  AlkPhos  101  01-17  PT/INR - ( 16 Jan 2024 11:02 )   PT: 12.7 sec;   INR: 1.15 ratio    PTT - ( 16 Jan 2024 11:02 )  PTT:37.2 sec  Urinalysis Basic - ( 17 Jan 2024 04:09 )  Color: x / Appearance: x / SG: x / pH: x  Gluc: 86 mg/dL / Ketone: x  / Bili: x / Urobili: x   Blood: x / Protein: x / Nitrite: x   Leuk Esterase: x / RBC: x / WBC x   Sq Epi: x / Non Sq Epi: x / Bacteria: x      CARDIAC MARKERS ( 16 Jan 2024 11:02 )  x     / x     / 61 U/L / x     / x          CAPILLARY BLOOD GLUCOSE  POCT Blood Glucose.: 79 mg/dL (17 Jan 2024 08:12)  POCT Blood Glucose.: 92 mg/dL (16 Jan 2024 22:26)  POCT Blood Glucose.: 96 mg/dL (16 Jan 2024 11:00)      Allergies  No Known Allergies    MEDICATIONS:  enoxaparin Injectable 40 milliGRAM(s) SubCutaneous every 24 hours  atorvastatin 20 milliGRAM(s) Oral at bedtime  dextrose 50% Injectable 25 Gram(s) IV Push once  dextrose 50% Injectable 12.5 Gram(s) IV Push once  dextrose 50% Injectable 25 Gram(s) IV Push once  dextrose Oral Gel 15 Gram(s) Oral once PRN  glucagon  Injectable 1 milliGRAM(s) IntraMuscular once  insulin lispro (ADMELOG) corrective regimen sliding scale   SubCutaneous three times a day before meals  insulin lispro (ADMELOG) corrective regimen sliding scale   SubCutaneous at bedtime  pantoprazole    Tablet 40 milliGRAM(s) Oral before breakfast  dextrose 5%. 1000 milliLiter(s) IV Continuous <Continuous>  dextrose 5%. 1000 milliLiter(s) IV Continuous <Continuous>  potassium chloride  10 mEq/100 mL IVPB 10 milliEquivalent(s) IV Intermittent every 1 hour  sodium chloride 0.9%. 1000 milliLiter(s) IV Continuous <Continuous>      RADIOLOGY & ADDITIONAL TESTS:  < from: CT Head No Cont (01.17.24 @ 08:09) >  IMPRESSION:  1.  Foci of poor discrimination of the gray-white matter junction in the   right cerebral hemisphere, compatible with acute infarctions as seen on   prior MRI.  2.  Faint ill-defined hyperattenuating focus about the right frontal lobe   infarction, more conspicuous when compared to prior CT imaging, likely   petechial hemorrhage versus focal hemorrhagic conversion. Recommend   follow-up imaging.      < from: MR Head No Cont (01.17.24 @ 02:08) >  IMPRESSION:  Small acute cortically based infarcts in the right parietal and temporal   lobes as well as the right insular cortex. Mild hemorrhagic conversion   right parietal cortically based infarct. No large intracranial hemorrhage   or vasogenic edema.    < from: US Duplex Carotid Arteries Complete, Bilateral (01.16.24 @ 15:07) >  IMPRESSION: No significant hemodynamic stenosis of either carotid artery.  Measurement of carotid stenosis is based on velocity parameters that   correlate the residual internal carotid diameter with that of the more   distal vessel in accordance with a method such as the North American   Symptomatic Carotid Endarterectomy Trial (NASCET).    < from: CT Brain Perfusion Maps Stroke (01.16.24 @ 11:13) >  IMPRESSION:  HEAD CT: IV contrast present from recent CTA. Mild cortical enhancement   right frontal parietal junction corresponding to an area of ischemia seen   on CT perfusion. No large intracranial hemorrhage.  CT PERFUSION: Moderate right parietal ischemia of 31 mL. No core infarct.      ASSESSMENT:  62 y/o female with PMHx of HTN, HLD presented to ED after found to have R M2 occlusion on mobile stroke unit after c/o L sided weakness and slurred speech after waking up 1/16 5AM, LKW 9PM. Upon arrival to [facility] ED, CTH revealed no large ICH, CTP revealed mild R frontal cortical enhancement, no core infarct with 31mL R parietal penumbra. NIHSS improved from 8 to 2, admitted to NSICU for q1hr neuro checks while in window for thrombectomy.       PLAN:   NEURO:   - neuro checks q4hrs   - pend CTH in AM   - MRI brain done   - pain control PRN: tylenol   - holding ASA until cleared by stroke neurology   - stroke core measures  - pend PT/OT   - stroke neurology following    CV:    - SBP goal 100-160  - LDL 44   - Atorvastatin 20   - pend echo   - holding home losartan due to softer SBP in NSICU   - cardiology consulted     Resp:   - tolerating RA     GI:     - diet: DASH   - LBM: outpatient   - bowel regimen: Senna, Miralax     :   - IVL, voiding     Heme:    - DVT ppx: SCDs, SQL 40mg   - pend b/l LE duplex      ID:    - no issues      Endo:  - A1C 5.5, ISS   - TSH 1.43     Dispo: Downgraded to medicine telemetry     D/w Dr. Maciel and Dr. Saucedo 1/17 @11:11AM  -------------------------------------------------------------    ATTENDING ATTESTATION    62 y/o female with acute CVA due to R M2 occlusion, no IV thrombolysis as wake-up stroke, NIH 2 - was not a candidate for MT.  PMHx of HTN, HLD.    Plan:  neurochecks  stroke w/up in progress, TTE with bubble study; Cardiology eval requested  start Lipitor 20 (LDL 44, at goal)  CTh in am to follow on petechial heme, stable so far  SBP goal 100-160, hold BP meds for now to avoid hypotension (low normal now)  hold ASA for now, consider after stability CTh  SCDs, start SQL for DVT ppx; obtain BL LE Doppler  rest as above    Time spent - 35 min, non-critical, included review of relevant history, clinical examination, review of data and images, discussion of treatment with the multidisciplinary team and any consultants involved in this patient’s care.

## 2024-01-17 NOTE — CONSULT NOTE ADULT - PROBLEM SELECTOR RECOMMENDATION 9
Presents + stroke, CT - g R M2 occlusion with hemorrhagic conversion  As discussed with neuro PA - stroke concerning for embolic event and requesting TTE, CHLOE and IL vs MCOT  Patient does not have a cardiologist and never had any cardiac testing  TTE  CHLOE  IL vs MCOT   Continue telemetry monitoring while in house. Presents + stroke, CT - g R M2 occlusion with hemorrhagic conversion  As discussed with neuro PA - stroke concerning for embolic event and requesting TTE, CHLOE and ILR vs MCOT  Patient does not have a cardiologist and never had any cardiac testing  TTE  CHLOE  ILR vs MCOT   Continue telemetry monitoring while in house.

## 2024-01-17 NOTE — PROGRESS NOTE ADULT - SUBJECTIVE AND OBJECTIVE BOX
[facility] Division of Hospital Medicine    Chief Complaint:      SUBJECTIVE / OVERNIGHT EVENTS:     63 year old female with PMH of HTN,HLP presents with c/o Lt sided weakness and slurred speech. Pt  was  last seen by family 9 pm on 01/15/24 before going to bed, she woke up at 5 am on 01/16/24 with L sided weakness and slurred speech. CTH neg, CTA performed while en route demonstrating R M2 occlusion. CT head/perfusion scan ED showed Mild cortical enhancement   right frontal parietal junction corresponding to an area of ischemia. EMS had documented NIH score 8; however, on arrival to ED it was noticed to be 2 mild facial droop/lt sided weakness which improved further in the ED. Neurology admitted her to ICU for closer monitoring. MRI brain was done and showed acute infarct of right parietal and temporal area with petechial hemorrhagic conversion which has remained stable on repeat CTH. Now she is being downgraded to medicine service. patient herself denies any symptoms.     MEDICATIONS  (STANDING):  atorvastatin 20 milliGRAM(s) Oral at bedtime  dextrose 50% Injectable 12.5 Gram(s) IV Push once  dextrose 50% Injectable 25 Gram(s) IV Push once  dextrose 50% Injectable 25 Gram(s) IV Push once  enoxaparin Injectable 40 milliGRAM(s) SubCutaneous every 24 hours  insulin lispro (ADMELOG) corrective regimen sliding scale   SubCutaneous three times a day before meals  polyethylene glycol 3350 17 Gram(s) Oral daily  senna 2 Tablet(s) Oral at bedtime    MEDICATIONS  (PRN):  acetaminophen     Tablet .. 650 milliGRAM(s) Oral every 6 hours PRN Temp greater or equal to 38C (100.4F), Mild Pain (1 - 3)        I&O's Summary    16 Jan 2024 07:01  -  17 Jan 2024 07:00  --------------------------------------------------------  IN: 2050 mL / OUT: 2150 mL / NET: -100 mL        PHYSICAL EXAM:  Vital Signs Last 24 Hrs  T(C): 36.6 (17 Jan 2024 08:00), Max: 37 (16 Jan 2024 16:05)  T(F): 97.8 (17 Jan 2024 08:00), Max: 98.6 (16 Jan 2024 16:05)  HR: 65 (17 Jan 2024 11:00) (52 - 93)  BP: 132/60 (17 Jan 2024 11:00) (100/40 - 169/73)  BP(mean): 83 (17 Jan 2024 11:00) (59 - 88)  RR: 16 (17 Jan 2024 11:00) (12 - 18)  SpO2: 100% (17 Jan 2024 11:00) (95% - 100%)    Parameters below as of 17 Jan 2024 08:00  Patient On (Oxygen Delivery Method): room air            CONSTITUTIONAL: NAD,  ENMT: normocephalic, atraumatic  RESPIRATORY: Normal respiratory effort; lungs are clear to auscultation bilaterally  CARDIOVASCULAR: Regular rate and rhythm, normal S1 and S2, no murmur/rub/gallop  ABDOMEN: Nontender to palpation, no rebound/guarding; No hepatosplenomegaly  MUSCULOSKELETAL:  No edema  PSYCH: A+O to person, place, and time; affect appropriate  NEUROLOGY: CN 2-12 are intact and symmetric; no gross deficits;       LABS:                        12.5   6.70  )-----------( 173      ( 17 Jan 2024 04:09 )             35.9     01-17    141  |  101  |  8.1  ----------------------------<  86  3.2<L>   |  25.0  |  0.67    Ca    8.6      17 Jan 2024 04:09  Phos  3.8     01-17  Mg     1.8     01-17    TPro  7.0  /  Alb  3.6  /  TBili  0.5  /  DBili  x   /  AST  23  /  ALT  18  /  AlkPhos  101  01-17    PT/INR - ( 16 Jan 2024 11:02 )   PT: 12.7 sec;   INR: 1.15 ratio         PTT - ( 16 Jan 2024 11:02 )  PTT:37.2 sec  CARDIAC MARKERS ( 16 Jan 2024 11:02 )  x     / x     / 61 U/L / x     / x          Urinalysis Basic - ( 17 Jan 2024 04:09 )    Color: x / Appearance: x / SG: x / pH: x  Gluc: 86 mg/dL / Ketone: x  / Bili: x / Urobili: x   Blood: x / Protein: x / Nitrite: x   Leuk Esterase: x / RBC: x / WBC x   Sq Epi: x / Non Sq Epi: x / Bacteria: x        CAPILLARY BLOOD GLUCOSE      POCT Blood Glucose.: 81 mg/dL (17 Jan 2024 10:59)  POCT Blood Glucose.: 79 mg/dL (17 Jan 2024 08:12)  POCT Blood Glucose.: 92 mg/dL (16 Jan 2024 22:26)        RADIOLOGY & ADDITIONAL TESTS:  Results Reviewed:   Imaging Personally Reviewed:  Electrocardiogram Personally Reviewed:

## 2024-01-17 NOTE — OCCUPATIONAL THERAPY INITIAL EVALUATION ADULT - LIVES WITH, PROFILE
Pt reports lives with her brother and his wife in a house. [address] with handrail, no steps inside to bed/bath. Pt states brother can assist but works, brothers wife is home 24/7./other relative

## 2024-01-17 NOTE — PROGRESS NOTE ADULT - ASSESSMENT
INCOMPLETE  ASSESSMENT: Patient is a 63 year old female with PMHx of HTN,HLD who presented to [facility] on 1/16/24 with as per ED note left sided weakness and slurred speech. As per ED note patient was last seen by family 9:00 pm on 1/15/24 upon going to bed. Patient woke up the morning of 1/16/24 at 5:00 am with left sided weakness and slurred speech. [facility] mobile stroke unit transported patient and non-con CTH was negative and CTA performed while en route demonstrated right M2 occlusion. CTP in [facility] ED showed mild cortical enhancement of theright frontal parietal junction corresponding to an area of ischemia seen Per ED,EMS documented NIHSS score of 8, however upon arrival to [facility] NIHSS was a 2. Upon stroke team evaluation patient complained of right hand numbness and complete resolution of left sided weakness and dysarthria. Patient not a tenecteplase or mechanical thrombectomy candidate due to improving symptoms and almost back to baseline. Patient admitted to neuro ICU for further monitoring for change in neurological exam. MRI brain w/out revealed small acute cortically based infarcts in the right parietal and temporal lobes as well as the right insular cortex. Also noted mild hemorrhagic conversion of the  right parietal cortically based infarct. Did not note any large intracranial hemorrhage or vasogenic edema.    Etiology embolic stroke of undetermined source.     NEURO:   -Neurologically with improvement from initial presentation and slight RLE drift and right facial asymmetry (could be baseline as per patient)  -Continue close monitoring for neurologic deterioration    -              Close monitoring for change in neurological status as patient is still within the intervention window and with right M2 occlusion. Stroke neuro checks q 1 hour as per neuro ICU  - CT head 1/16/24 revealed foci of poor discrimination of the gray-white matter junction in the right cerebral hemisphere, compatible with acute infarctions as seen on prior MRI. Also noted faint ill-defined hyperattenuating focus about the right frontal lobe infarction that is likely petechial hemorrhage versus focal hemorrhagic conversion.  -Appears to be neurologically tolerating -150s. Can maintain this as goal and avoid rapid fluctuations and hypotension.    -ANTITHROMBOTIC THERAPY: ASA 81mg daily.  -titrate statin to LDL goal less than 70. LDL 44  -CTA head w/ and CTA neck w/ pending due to patient obtaining vessel imaging in the mobile stroke unit   -Dysphagia screen: Pass. advance as tolerated.  -Physical therapy/OT/Speech eval/treatment.     -CARDIOVASCULAR: obtain TTE. cardiac monitoring w/ telemetry for now, further evaluation pending findings of noted workup                              -HEMATOLOGY: H/H 13.2/39.0. Platelets 188. patient should have all age and risk appropriate malignancy screenings with PCP or sooner if clinically suspected      DVT ppx: Heparin s.c [x] LMWH []     PULMONARY: CXR noting no focal consolidation. Did note a 7 mm dense nodule at the right lung base may represent a granuloma. A noncontrast CT could be performed for confirmation.   -protecting airway, saturating well     RENAL: BUN/Cr 8.3/0.67. monitor urine output, maintain adequate hydration       Na Goal:  135-145    ID: afebrile, no leukocytosis, monitor for si/sx of infection     OTHER: condition and plan of care d/w patient, questions and concerns addressed.     DISPOSITION: Rehab or home depending on PT eval once stable and workup is complete      CORE MEASURES:        Admission NIHSS: 2     Tenecteplase : [] YES [x] NO      LDL/HDL/A1C: 44/44/5.5%     Depression Screen- if depression hx and/or present      Statin Therapy: pending      Dysphagia Screen: [x] PASS [] FAIL     Smoking [] YES [x] NO      Afib [] YES [x] NO     Stroke Education [] YES [] NO- pending and ordered     Obtain screening lower extremity venous ultrasound in patients who meet 1 or more of the following criteria as patient is high risk for DVT/PE on admission:   [] History of DVT/PE  []Hypercoagulable states (Factor V Leiden, Cancer, OCP, etc. )  []Prolonged immobility (hemiplegia/hemiparesis/post operative or any other extended immobilization)  [] Transferred from outside facility (Rehab or Long term care)  [] Age </= to 50 ASSESSMENT: Patient is a 63 year old female with PMHx of HTN,HLD who presented to [facility] on 1/16/24 with as per ED note left sided weakness and slurred speech. As per ED note patient was last seen by family 9:00 pm on 1/15/24 upon going to bed. Patient woke up the morning of 1/16/24 at 5:00 am with left sided weakness and slurred speech. [facility] mobile stroke unit transported patient and non-con CTH was negative and CTA performed while en route demonstrated right M2 occlusion. CTP in [facility] ED showed mild cortical enhancement of theright frontal parietal junction corresponding to an area of ischemia seen Per ED,EMS documented NIHSS score of 8, however upon arrival to [facility] NIHSS was a 2. Upon stroke team evaluation patient complained of right hand numbness and complete resolution of left sided weakness and dysarthria. Patient not a tenecteplase or mechanical thrombectomy candidate due to improving symptoms and almost back to baseline. Patient admitted to neuro ICU for further monitoring for change in neurological exam. MRI brain w/out revealed small acute cortically based infarcts in the right parietal and temporal lobes as well as the right insular cortex. Also noted mild hemorrhagic conversion of the right parietal cortically based infarct. Did not note any large intracranial hemorrhage or vasogenic edema.    Etiology embolic stroke of undetermined source.     NEURO:   -Neurologically with improvement in RLE drift, as it is no longer present. However, now noted with new neurological symptoms of left visual extinction and left sensory extinction.   - CT head 1/16/24 revealed foci of poor discrimination of the gray-white matter junction in the right cerebral hemisphere, compatible with acute infarctions as seen on prior MRI. Also noted faint ill-defined hyperattenuating focus about the right frontal lobe infarction that is likely petechial hemorrhage versus focal hemorrhagic conversion.  -Obtain CT head on 1/18/24 AM for monitoring of hemorraghic transformation   -Continue close monitoring for neurologic deterioration    -Stroke neuro checks q 2 hours  -Appears to be neurologically tolerating -150s. Can maintain this as goal and avoid rapid fluctuations and hypotension.    -ANTITHROMBOTIC THERAPY: On hold at this time in setting of hemorraghic transformation and the risk of further hemorrhage outweighs the benefit of antiplatelet at this time.   -titrate statin to LDL goal less than 70. LDL 44  -Obtain CTA head w/ and CTA neck w/ due to patient obtaining vessel imaging in the mobile stroke unit   -Dysphagia screen: Pass. advance as tolerated.  -Physical therapy/OT/Speech eval/treatment.     -CARDIOVASCULAR: obtain TTE. cardiac monitoring w/ telemetry for now, further evaluation pending findings of noted workup  -Will need eventual CHLOE and ILR for further evaluation of embolic source                           -HEMATOLOGY: H/H 13.2/39.0. Platelets 188. patient should have all age and risk appropriate malignancy screenings with PCP or sooner if clinically suspected   -Consideration of CT CAP for evaluation of malignancy as source of potential hypercoagulable state and patient 63 years old. Will be determine necessity after TTE/CHLOE and cardiac monitoring.      DVT ppx: Heparin s.c [] LMWH [] - SCD for now. Would hold pharmacological dvt ppx at this time in setting of hemorrhagic transforation Further management will be dependent on head CT on 1/18/24    PULMONARY: CXR noting no focal consolidation. Did note a 7 mm dense nodule at the right lung base may represent a granuloma. A noncontrast CT could be performed for confirmation.   -protecting airway, saturating well     RENAL: BUN/Cr 8.3/0.67. monitor urine output, maintain adequate hydration       Na Goal:  135-145    ID: afebrile, no leukocytosis, monitor for si/sx of infection     OTHER: condition and plan of care d/w patient, questions and concerns addressed.     DISPOSITION: Rehab or home depending on PT eval once stable and workup is complete      CORE MEASURES:        Admission NIHSS: 2     Tenecteplase : [] YES [x] NO      LDL/HDL/A1C: 44/44/5.5%     Depression Screen- if depression hx and/or present      Statin Therapy: pending      Dysphagia Screen: [x] PASS [] FAIL     Smoking [] YES [x] NO      Afib [] YES [x] NO     Stroke Education [] YES [] NO- pending and ordered     Obtain screening lower extremity venous ultrasound in patients who meet 1 or more of the following criteria as patient is high risk for DVT/PE on admission:   [] History of DVT/PE  []Hypercoagulable states (Factor V Leiden, Cancer, OCP, etc. )  []Prolonged immobility (hemiplegia/hemiparesis/post operative or any other extended immobilization)  [] Transferred from outside facility (Rehab or Long term care)  [] Age </= to 50

## 2024-01-17 NOTE — PHYSICAL THERAPY INITIAL EVALUATION ADULT - PERTINENT HX OF CURRENT PROBLEM, REHAB EVAL
63 year old female with PMH of HTN,HLP presents with c/o Lt sided weakness and slurred speech. Pt  was  last seen by family 9 pm on 01/15/24 before going to bed, she woke up at 5 am on 01/16/24 with L sided weakness and slurred speech. CTH neg, CTA performed while en route demonstrating R M2 occlusion. CT head/perfusion scan ED showed Mild cortical enhancement   right frontal parietal junction corresponding to an area of ischemia. EMS had documented NIH score 8; however, on arrival to ED it was noticed to be 2 mild facial droop/lt sided weakness which improved further in the ED. Neurology admitted her to ICU for closer monitoring. MRI brain was done and showed acute infarct of right parietal and temporal area with petechial hemorrhagic conversion which has remained stable on repeat CTH. Now she is being downgraded to medicine service. patient herself denies any symptoms.

## 2024-01-17 NOTE — OCCUPATIONAL THERAPY INITIAL EVALUATION ADULT - DIAGNOSIS, OT EVAL
63 year old Female with Acute right parietal and temporal ischemic stroke with hemorrhagic conversion

## 2024-01-17 NOTE — PROGRESS NOTE ADULT - ASSESSMENT
63 year old female with PMH of HTN, HLP presents with c/o Lt sided weakness and slurred speech. Pt  was  last seen by family 9 pm on 01/15/24 before going to bed, she woke up at 5 am on 01/16/24 with L sided weakness and slurred speech. CTH neg, CTA performed while en route demonstrating R M2 occlusion. CT head/perfusion scan ED showed Mild cortical enhancement   right frontal parietal junction corresponding to an area of ischemia. EMS had documented NIH score 8; however, on arrival to ED it was noticed to be 2 mild facial droop/lt sided weakness which improved further in the ED. Neurology admitted her to ICU for closer monitoring. MRI brain was done and showed acute infarct of right parietal and temporal area with petechial hemorrhagic conversion which has remained stable on repeat CTH. Now she is being downgraded to medicine service. patient herself denies any symptoms.    #. Acute right parietal and temporal ischemic stroke with hemorrhagic conversion. I did not appreciate any neurological deficit. RN and neurology PA mentioned possible left neglect. petechial hemorrhage stable on repeat CTH. plan is to repeat CTH tomorrow. TTE and venous doppler of LE are also pending. if CTH stable tomorrow then aspirin would be started.    #. HTN. BP normal so continue to hold home BP meds.    #. HLD. c/w atorvastatin    #. DVT prophylaxis: lovenox

## 2024-01-17 NOTE — CHART NOTE - NSCHARTNOTEFT_GEN_A_CORE
Neuro ICU Downgrade Note    HPI: 63 year old female with PMH of HTN, HLP presents with c/o Lt sided weakness, with slurred speech. Pt  was  last seen by family 9 pm on 1/15 at night upon going to bed, she woke up yesterday morning at 5 am with L sided weakness and slurred speech. [facility] mobile stroke unit transported pt, non-con CTH neg, CTA performed while en route demonstrating R M2 occlusion. CT head/perfusion scan ED showed Mild cortical enhancement right frontal parietal junction corresponding to an area of ischemia seen. CT PERFUSION: Moderate right parietal ischemia. No core infarct. Per ED/EMS documented NIH score 8 on arrival, mild rt facial droop/lt sided weakness which is resolved now. Ed spoke with Neurology team. Pt is AAOX3. C/O mild rt sided headache, clear speech, denies any weakness/numbness. Pt reports complained with medicines.     PMH  HTN ,HLP    FH: DM  Social hx: denies smoking /alcohol/illicit drug uses. Lives with brother and Niece      INTERVAL HPI  :  63y Female s/p __ seen lying comfortably in bed. Tolerating diet. Passing gas/BM. Voiding. [place] in with __ clear urine. Denies headache, weakness, numbness, n/v/d, fevers, chills, chest pain, SOB.     Vital Signs Last 24 Hrs  T(C): 36.6 (17 Jan 2024 08:00), Max: 37 (16 Jan 2024 16:05)  T(F): 97.8 (17 Jan 2024 08:00), Max: 98.6 (16 Jan 2024 16:05)  HR: 55 (17 Jan 2024 09:00) (52 - 93)  BP: 129/62 (17 Jan 2024 09:00) (100/40 - 169/73)  BP(mean): 83 (17 Jan 2024 09:00) (59 - 88)  RR: 13 (17 Jan 2024 09:00) (12 - 18)  SpO2: 100% (17 Jan 2024 09:00) (95% - 100%)    Parameters below as of 17 Jan 2024 08:00  Patient On (Oxygen Delivery Method): room air    PHYSICAL EXAM:  GENERAL: NAD, well-groomed  HEAD:  Atraumatic, normocephalic  DRAINS:   WOUND: Dressing clean dry intact  PIERO COMA SCORE: E- V- M- =       E: 4= opens eyes spontaneously 3= to voice 2= to noxious 1= no opening       V: 5= oriented 4= confused 3= inappropriate words 2= incomprehensible sounds 1= nonverbal 1T= intubated       M: 6= follows commands 5= localizes 4= withdraws 3= flexor posturing 2= extensor posturing 1= no movement  MENTAL STATUS: AAO x3; Awake/Comatose; Opens eyes spontaneously/to voice/to light touch/to noxious stimuli; Appropriately conversant without aphasia/Nonverbal; following simple commands/mimicking/not following commands  CRANIAL NERVES: Visual acuity normal for age, visual fields full to confrontation, PERRL. EOMI without nystagmus. Facial sensation intact V1-3 distribution b/l. Face symmetric w/ normal eye closure and smile, tongue midline. Hearing grossly intact. Speech clear. Head turning and shoulder shrug intact.   REFLEXES: PERRL. Corneals intact b/l. Gag intact. Cough intact. Oculocephalic reflex intact (Doll's eye). Negative Mcgraw's b/l. Negative clonus b/l  MOTOR: strength 5/5 b/l upper and lower extremities  Uppers     Delt (C5/6)     Bicep (C5/6)     Wrist Extend (C6)     Tricep (C7)     HG (C8/T1)  R                     5/5                 5/5                         5/5                           5/5                   5/5  L                      5/5                 5/5                         5/5                           5/5                   5/5  Lowers      HF(L1/L2)     KE (L3)     DF (L4)     EHL (L5)     PF (S1)      R                     5/5              5/5           5/5           5/5            5/5  L                     5/5               5/5          5/5            5/5            5/5  SENSATION: grossly intact to light touch all extremities  COORDINATION: Gait intact; rapid alternating movements intact; heel to shin intact; no upper extremity dysmetria  CHEST/LUNG: Clear to auscultation bilaterally; no rales, rhonchi, wheezing, or rubs  HEART: +S1/+S2; Regular rate and rhythm; no murmurs, rubs, or gallops  ABDOMEN: Soft, nontender, nondistended; bowel sounds present all four quadrants  EXTREMITIES:  2+ peripheral pulses, no clubbing, cyanosis, or edema  SKIN: Warm, dry; no rashes or lesions    LABS:                        12.5   6.70  )-----------( 173      ( 17 Jan 2024 04:09 )             35.9     01-17    141  |  101  |  8.1  ----------------------------<  86  3.2<L>   |  25.0  |  0.67    Ca    8.6      17 Jan 2024 04:09  Phos  3.8     01-17  Mg     1.8     01-17    TPro  7.0  /  Alb  3.6  /  TBili  0.5  /  DBili  x   /  AST  23  /  ALT  18  /  AlkPhos  101  01-17    PT/INR - ( 16 Jan 2024 11:02 )   PT: 12.7 sec;   INR: 1.15 ratio         PTT - ( 16 Jan 2024 11:02 )  PTT:37.2 sec  Urinalysis Basic - ( 17 Jan 2024 04:09 )    Color: x / Appearance: x / SG: x / pH: x  Gluc: 86 mg/dL / Ketone: x  / Bili: x / Urobili: x   Blood: x / Protein: x / Nitrite: x   Leuk Esterase: x / RBC: x / WBC x   Sq Epi: x / Non Sq Epi: x / Bacteria: x        01-16 @ 07:01  -  01-17 @ 07:00  --------------------------------------------------------  IN: 2050 mL / OUT: 2150 mL / NET: -100 mL        RADIOLOGY & ADDITIONAL TESTS:     from: CT Brain Perfusion Maps Stroke (01.16.24 @ 11:13) >  IMPRESSION:  HEAD CT: IV contrast present from recent CTA. Mild cortical enhancement   right frontal parietal junction corresponding to an area of ischemia seen   on CT perfusion. No large intracranial hemorrhage.  CT PERFUSION: Moderate right parietal ischemia of 31 mL. No core infarct.    Findings discussed with Dr. Wiggins at 11:19 AM on 1/16/2024

## 2024-01-17 NOTE — PHYSICAL THERAPY INITIAL EVALUATION ADULT - ADDITIONAL COMMENTS
Pt reports lives with her brother and his wife in a house. [address] with handrail, no steps inside to bed/bath. Pt states brother can assist but works, brothers wife is home 24/7.

## 2024-01-17 NOTE — PHYSICAL THERAPY INITIAL EVALUATION ADULT - GENERAL OBSERVATIONS, REHAB EVAL
Pt received in bed, + IV + tele//BP monitoring, breathing on RA in NAD, in 0/10 pain, agreeable to PT evaluation

## 2024-01-18 ENCOUNTER — TRANSCRIPTION ENCOUNTER (OUTPATIENT)
Age: 64
End: 2024-01-18

## 2024-01-18 DIAGNOSIS — I63.9 CEREBRAL INFARCTION, UNSPECIFIED: ICD-10-CM

## 2024-01-18 LAB
ANION GAP SERPL CALC-SCNC: 11 MMOL/L — SIGNIFICANT CHANGE UP (ref 5–17)
BUN SERPL-MCNC: 13.1 MG/DL — SIGNIFICANT CHANGE UP (ref 8–20)
CALCIUM SERPL-MCNC: 8.7 MG/DL — SIGNIFICANT CHANGE UP (ref 8.4–10.5)
CHLORIDE SERPL-SCNC: 103 MMOL/L — SIGNIFICANT CHANGE UP (ref 96–108)
CO2 SERPL-SCNC: 25 MMOL/L — SIGNIFICANT CHANGE UP (ref 22–29)
CREAT SERPL-MCNC: 0.71 MG/DL — SIGNIFICANT CHANGE UP (ref 0.5–1.3)
EGFR: 94 ML/MIN/1.73M2 — SIGNIFICANT CHANGE UP
GLUCOSE SERPL-MCNC: 89 MG/DL — SIGNIFICANT CHANGE UP (ref 70–99)
HCT VFR BLD CALC: 35.5 % — SIGNIFICANT CHANGE UP (ref 34.5–45)
HGB BLD-MCNC: 11.6 G/DL — SIGNIFICANT CHANGE UP (ref 11.5–15.5)
MAGNESIUM SERPL-MCNC: 2.1 MG/DL — SIGNIFICANT CHANGE UP (ref 1.6–2.6)
MCHC RBC-ENTMCNC: 28.9 PG — SIGNIFICANT CHANGE UP (ref 27–34)
MCHC RBC-ENTMCNC: 32.7 GM/DL — SIGNIFICANT CHANGE UP (ref 32–36)
MCV RBC AUTO: 88.5 FL — SIGNIFICANT CHANGE UP (ref 80–100)
PHOSPHATE SERPL-MCNC: 3.2 MG/DL — SIGNIFICANT CHANGE UP (ref 2.4–4.7)
PLATELET # BLD AUTO: 182 K/UL — SIGNIFICANT CHANGE UP (ref 150–400)
POTASSIUM SERPL-MCNC: 3.4 MMOL/L — LOW (ref 3.5–5.3)
POTASSIUM SERPL-SCNC: 3.4 MMOL/L — LOW (ref 3.5–5.3)
RBC # BLD: 4.01 M/UL — SIGNIFICANT CHANGE UP (ref 3.8–5.2)
RBC # FLD: 13.2 % — SIGNIFICANT CHANGE UP (ref 10.3–14.5)
SODIUM SERPL-SCNC: 139 MMOL/L — SIGNIFICANT CHANGE UP (ref 135–145)
WBC # BLD: 6.62 K/UL — SIGNIFICANT CHANGE UP (ref 3.8–10.5)
WBC # FLD AUTO: 6.62 K/UL — SIGNIFICANT CHANGE UP (ref 3.8–10.5)

## 2024-01-18 PROCEDURE — 99232 SBSQ HOSP IP/OBS MODERATE 35: CPT

## 2024-01-18 PROCEDURE — 93312 ECHO TRANSESOPHAGEAL: CPT | Mod: 26

## 2024-01-18 PROCEDURE — 70450 CT HEAD/BRAIN W/O DYE: CPT | Mod: 26

## 2024-01-18 PROCEDURE — 99233 SBSQ HOSP IP/OBS HIGH 50: CPT | Mod: FS

## 2024-01-18 RX ORDER — ASPIRIN/CALCIUM CARB/MAGNESIUM 324 MG
81 TABLET ORAL DAILY
Refills: 0 | Status: DISCONTINUED | OUTPATIENT
Start: 2024-01-18 | End: 2024-01-20

## 2024-01-18 RX ORDER — ONDANSETRON 8 MG/1
4 TABLET, FILM COATED ORAL EVERY 6 HOURS
Refills: 0 | Status: DISCONTINUED | OUTPATIENT
Start: 2024-01-18 | End: 2024-01-20

## 2024-01-18 RX ORDER — ENOXAPARIN SODIUM 100 MG/ML
40 INJECTION SUBCUTANEOUS EVERY 24 HOURS
Refills: 0 | Status: DISCONTINUED | OUTPATIENT
Start: 2024-01-18 | End: 2024-01-20

## 2024-01-18 RX ORDER — POTASSIUM CHLORIDE 20 MEQ
40 PACKET (EA) ORAL ONCE
Refills: 0 | Status: COMPLETED | OUTPATIENT
Start: 2024-01-18 | End: 2024-01-18

## 2024-01-18 RX ORDER — POTASSIUM CHLORIDE 20 MEQ
20 PACKET (EA) ORAL ONCE
Refills: 0 | Status: COMPLETED | OUTPATIENT
Start: 2024-01-18 | End: 2024-01-18

## 2024-01-18 RX ADMIN — ATORVASTATIN CALCIUM 20 MILLIGRAM(S): 80 TABLET, FILM COATED ORAL at 23:06

## 2024-01-18 RX ADMIN — ENOXAPARIN SODIUM 40 MILLIGRAM(S): 100 INJECTION SUBCUTANEOUS at 23:06

## 2024-01-18 RX ADMIN — Medication 40 MILLIEQUIVALENT(S): at 18:06

## 2024-01-18 RX ADMIN — Medication 50 MILLIEQUIVALENT(S): at 08:51

## 2024-01-18 RX ADMIN — SENNA PLUS 2 TABLET(S): 8.6 TABLET ORAL at 23:06

## 2024-01-18 NOTE — PROGRESS NOTE ADULT - SUBJECTIVE AND OBJECTIVE BOX
Department of Cardiology                                                                  [facility]/[place] [address] [address] [facility]                                                            Telephone: [phone]. Fax:[phone]                                                                                     Pre-CHLOE Note        Narrative:  63 year old female with PMH of HTN, HLD, awoke on 1/16/24 with c/o parasthesias B/L hands and feet and right eye/orbital pain and presented to ED, last seen by family 9 pm last night upon going to bed, [facility] mobile stroke unit transported pt, NC CTH neg, CTA performed while en route demonstrating R M2 occlusion. CT head/perfusion scan ED showed Mild cortical enhancement right frontal parietal junction corresponding to an area of ischemia seen, CT PERFUSION: Moderate right parietal ischemia, no core infarct., EMS documented NIH score of 8, on arrival to ED NIHSS 2 , noted to have mild rt facial droop/lt sided weakness which has since resolved, now plan for CHLOE to R/O cardioembolic source of CVA.        ASA and Mallampati: Per Anesthesia    	  MEDICATIONS:  acetaminophen     Tablet .. 650 milliGRAM(s) Oral every 6 hours PRN  polyethylene glycol 3350 17 Gram(s) Oral daily  senna 2 Tablet(s) Oral at bedtime  atorvastatin 20 milliGRAM(s) Oral at bedtime  dextrose 50% Injectable 12.5 Gram(s) IV Push once  dextrose 50% Injectable 25 Gram(s) IV Push once  dextrose 50% Injectable 25 Gram(s) IV Push once      PHYSICAL EXAM:    T(C): 37 (01-18-24 @ 12:00), Max: 37.2 (01-17-24 @ 21:21)  HR: 78 (01-18-24 @ 12:00) (56 - 78)  BP: 122/82 (01-18-24 @ 12:00) (106/54 - 139/68)  RR: 18 (01-18-24 @ 12:00) (12 - 18)  SpO2: 99% (01-18-24 @ 12:00) (95% - 100%)    I&O's Summary    17 Jan 2024 07:01  -  18 Jan 2024 07:00  --------------------------------------------------------  IN: 660 mL / OUT: 900 mL / NET: -240 mL    18 Jan 2024 07:01  -  18 Jan 2024 12:23  --------------------------------------------------------  IN: 0 mL / OUT: 0 mL / NET: 0 mL    Constitutional: A & O x 3  HEENT:   Normal oral mucosa, PERRL, EOMI	  Cardiovascular: Normal S1 S2, No JVD, No murmurs, No edema  Respiratory: Lungs clear to auscultation	  Gastrointestinal:  Soft, Non-tender, + BS	  Skin: No rashes, No ecchymoses, No cyanosis  Neurologic: Non-focal  Extremities: Normal range of motion, No clubbing, cyanosis or edema  Vascular: Peripheral pulses palpable 2+ bilaterally      LABS:	 	                            11.6   6.62  )-----------( 182      ( 18 Jan 2024 05:30 )             35.5     01-18    139  |  103  |  13.1  ----------------------------<  89  3.4<L>   |  25.0  |  0.71    Ca    8.7      18 Jan 2024 05:30  Phos  3.2     01-18  Mg     2.1     01-18    TPro  7.0  /  Alb  3.6  /  TBili  0.5  /  DBili  x   /  AST  23  /  ALT  18  /  AlkPhos  101  01-17      ASSESSMENT: 62yo female, presented to ED on 1/16/24 c/o parasthesias B/L hands and feet and right eye/orbital pain and presented to ED, last seen by family 9 pm last night upon going to bed, [facility] mobile stroke unit transported pt, NC CTH neg, CTA performed while en route demonstrating R M2 occlusion. CT head/perfusion scan ED showed Mild cortical enhancement right frontal parietal junction corresponding to an area of ischemia seen, CT PERFUSION: Moderate right parietal ischemia, no core infarct., EMS documented NIH score of 8, on arrival to ED NIHSS 2 , noted to have mild rt facial droop/lt sided weakness which has since resolved, now plan for CHLOE to R/O cardioembolic source of CVA.          -CHLOE as ordered  -Labs and ECG reviewed  -Procedure discussed with patient; risks and benefits explained; questions answered  -Consent obtained by Echocardiographer and anesthesiologist  -Utilized [employer]  for communication

## 2024-01-18 NOTE — PROGRESS NOTE ADULT - SUBJECTIVE AND OBJECTIVE BOX
[facility]                                                         CARDIOLOGY AT [facility]                                                                  [address] [place] [address]                                                         Telephone: [phone]. Fax:[phone]                                                                             PROGRESS NOTE    Reason for follow up:  Follow up on CVA  Update: Cm No arrhythmias  CHLOE done no clot no pfo  interviewed with [employer] interpreters      Review of symptoms:   Cardiac:  No chest pain. No dyspnea. No palpitations.  Respiratory: no cough. No dyspnea  Gastrointestinal: No diarrhea. No abdominal pain. No bleeding.   Neuro: No focal neuro complaints.      Vitals:  T(C): 37 (01-18-24 @ 12:00), Max: 37.2 (01-17-24 @ 21:21)  HR: 67 (01-18-24 @ 13:35) (56 - 78)  BP: 130/68 (01-18-24 @ 13:35) (106/54 - 139/68)  RR: 17 (01-18-24 @ 13:35) (12 - 18)  SpO2: 97% (01-18-24 @ 13:35) (95% - 100%)  Wt(kg): --  I&O's Summary    17 Jan 2024 07:01  -  18 Jan 2024 07:00  --------------------------------------------------------  IN: 660 mL / OUT: 900 mL / NET: -240 mL    18 Jan 2024 07:01  -  18 Jan 2024 14:49  --------------------------------------------------------  IN: 0 mL / OUT: 0 mL / NET: 0 mL      Weight (kg): 55.4 (01-16 @ 20:00)      PHYSICAL EXAM:  Appearance: Comfortable. No acute distress  HEENT:  Atraumatic. Normocephalic.  Normal oral mucosa  Neurologic: A & O x 3, no gross focal deficits.  Cardiovascular: RRR S1 S2, No murmur, no rubs/gallops. No JVD  Respiratory: Lungs clear to auscultation, unlabored   Gastrointestinal:  Soft, Non-tender, + BS  Lower Extremities: No edema  Psychiatry: Patient is calm. No agitation.   Skin: warm and dry.      CURRENT MEDICATIONS:  MEDICATIONS  (STANDING):  atorvastatin 20 milliGRAM(s) Oral at bedtime  dextrose 50% Injectable 12.5 Gram(s) IV Push once  dextrose 50% Injectable 25 Gram(s) IV Push once  dextrose 50% Injectable 25 Gram(s) IV Push once  polyethylene glycol 3350 17 Gram(s) Oral daily  senna 2 Tablet(s) Oral at bedtime          LABS:	 	  CARDIAC MARKERS ( 16 Jan 2024 11:02 )  x     / x     / 61 U/L / x     / x      p-BNP 16 Jan 2024 11:02: x                              11.6   6.62  )-----------( 182      ( 18 Jan 2024 05:30 )             35.5     01-18    139  |  103  |  13.1  ----------------------------<  89  3.4<L>   |  25.0  |  0.71    Ca    8.7      18 Jan 2024 05:30  Phos  3.2     01-18  Mg     2.1     01-18    TPro  7.0  /  Alb  3.6  /  TBili  0.5  /  DBili  x   /  AST  23  /  ALT  18  /  AlkPhos  101  01-17    proBNP:   Lipid Profile: Date: 01-17 @ 04:09  Total cholesterol 100; Direct LDL: --; HDL: 44; Triglycerides:61    HgA1c:   TSH: Thyroid Stimulating Hormone, Serum: 1.43 uIU/mL      DIAGNOSTIC TESTING:  CHLOE  < from: TTE W or WO Ultrasound Enhancing Agent (01.17.24 @ 15:43) >  Left Ventricle:  Left ventricular wall thickness is normal on limited views. Left ventricular systolic function is normal with an ejection fraction visually estimated at 55 to 60%. There are no regional wall motion abnormalities seen. There is normal left ventricular diastolic function, with normal filling pressure. There is characteristic tapering of the LV apex on contrast images reminiscentof apical variant hypertrophic cardiomyopathy yet, the LV walls do not appear thickened on the obtained echo views. Consider cardiac MRI if clinically indicated.     Right Ventricle:  The right ventricular cavity is normal in size, normal wall thickness and normal systolic function.     Left Atrium:  The left atrium is normal.     Right Atrium:  The right atrium is mildly dilated in size.     Interatrial Septum:  Lipomatous interatrial septal hypertrophy present. Agitated saline injection was negative for intracardiac shunt.     Aortic Valve:  The aortic valve was not well visualized. There is mild to moderate aortic regurgitation.     Mitral Valve:  There is mitral valve thickening of the anterior and posterior leaflets. There is mild calcification of the mitral valve annulus. There is trace mitral regurgitation.     Tricuspid Valve:  The tricuspid valve was not well visualized. There is mild tricuspid regurgitation. Estimated pulmonary artery systolic pressure is 29 mmHg.     Pulmonic Valve:  The pulmonic valve was not well visualized.     Aorta:  The aortic root appears normal in size.     Pericardium:  No pericardial effusion seen.     Systemic Veins:  The inferior vena cava is normal in size measuring 1.93 cm in diameter, (normal<2.1cm) with normal inspiratory collapse (normal >50%) consistent with normal right atrial pressure (~3, range 0-5mmHg).  ____________________________________________________________________  QUANTITATIVE DATA:  Left Ventricle Measurements: (Indexed to BSA)    < end of copied text >    < from: MR Head No Cont (01.17.24 @ 02:08) >  Small acute cortically based infarcts in the right parietal and temporal   lobes as well as the right insular cortex. Mild hemorrhagic conversion   right parietal cortically based infarct. No large intracranial hemorrhage   or vasogenic edema.

## 2024-01-18 NOTE — DISCHARGE NOTE NURSING/CASE MANAGEMENT/SOCIAL WORK - NSDCVIVACCINE_GEN_ALL_CORE_FT
Tdap; 04-May-2016 14:07; Barbara Dasilva (RN); [employer]; [id number]; IntraMuscular; Deltoid Right.; 0.5 milliLiter(s); VIS (VIS Published: 09-May-2013, VIS Presented: 04-May-2016);

## 2024-01-18 NOTE — PROGRESS NOTE ADULT - SUBJECTIVE AND OBJECTIVE BOX
Cerebral infarction    HPI:   63 year old female with PMH of HTN,HLP presents with c/o Lt sided weakness, with slurrey speech. Pt  was  last seen by family 9 pm last night upon going to bed, she woke up this morning at 5 am with L sided weakness and slurred speech. [facility] mobile stroke unit transported pt, non-con CTH neg, CTA performed while en route demonstrating R M2 occlusion. CT head/perfusion scan ED showed Mild cortical enhancement   right frontal parietal junction corresponding to an area of ischemia seen.CT PERFUSION: Moderate right parietal ischemia . No core infarct..Per ED,EMS documented NIH score 8,on arrival 2 ,mild rt facial droop/lt sided weekness which is resolved now.Ed spoke with Neurology team.Pt is AAOX3.C/O mild rt sided headache, clear speech, denies any weakness/numbness. Pt reports complained with medicines. (16 Jan 2024 12:30)    Interval History:  Patient was seen and examined at bedside around 11 am.  Feels better.   No active complaints.   Denies chest pain, palpitations, shortness of breath, headache, dizziness, visual symptoms, difficulty speaking/swallowing, arm/leg weakness, paresthesia, nausea, vomiting or abdominal pain.    ROS:  As per interval history otherwise unremarkable.    PHYSICAL EXAM:  Vital Signs   T(C): 37 (18 Jan 2024 12:00), Max: 37.2 (17 Jan 2024 21:21)  T(F): 98.6 (18 Jan 2024 12:00), Max: 99 (17 Jan 2024 21:21)  HR: 67 (18 Jan 2024 13:35) (56 - 78)  BP: 130/68 (18 Jan 2024 13:35) (106/54 - 139/68)  BP(mean): 79 (17 Jan 2024 20:45) (70 - 90)  RR: 17 (18 Jan 2024 13:35) (12 - 18)  SpO2: 97% (18 Jan 2024 13:35) (95% - 100%)  Parameters below as of 18 Jan 2024 13:35  Patient On (Oxygen Delivery Method): room air  General: Elderly female lying in bed comfortably. No acute distress  HEENT: EOMI. Clear conjunctivae. Moist mucus membrane  Neck: Supple.   Chest: Good air entry. No wheezing, rales or rhonchi.   Heart: Normal S1 & S2. RRR.   Abdomen: Non distended. Soft. Non-tender. + BS  Ext: No pedal edema. No calf tenderness   Neuro: Awake and alert. Motor 5/5 in all extremities. Sensation intact. Reflexes 1+. Cerebellar signs intact. Speech clear.   Skin: Warm and Dry  Psychiatry: Normal mood and affect    I&O's Summary    17 Jan 2024 07:01  -  18 Jan 2024 07:00  --------------------------------------------------------  IN: 660 mL / OUT: 900 mL / NET: -240 mL    18 Jan 2024 07:01  -  18 Jan 2024 15:43  --------------------------------------------------------  IN: 0 mL / OUT: 0 mL / NET: 0 mL    LABS:                        11.6   6.62  )-----------( 182      ( 18 Jan 2024 05:30 )             35.5     01-18    139  |  103  |  13.1  ----------------------------<  89  3.4<L>   |  25.0  |  0.71    Ca    8.7      18 Jan 2024 05:30  Phos  3.2     01-18  Mg     2.1     01-18    TPro  7.0  /  Alb  3.6  /  TBili  0.5  /  DBili  x   /  AST  23  /  ALT  18  /  AlkPhos  101  01-17    Urinalysis Basic - ( 18 Jan 2024 05:30 )    Color: x / Appearance: x / SG: x / pH: x  Gluc: 89 mg/dL / Ketone: x  / Bili: x / Urobili: x   Blood: x / Protein: x / Nitrite: x   Leuk Esterase: x / RBC: x / WBC x   Sq Epi: x / Non Sq Epi: x / Bacteria: x    RADIOLOGY & ADDITIONAL STUDIES:  Reviewed     MEDICATIONS  (STANDING):  atorvastatin 20 milliGRAM(s) Oral at bedtime  dextrose 50% Injectable 25 Gram(s) IV Push once  dextrose 50% Injectable 12.5 Gram(s) IV Push once  dextrose 50% Injectable 25 Gram(s) IV Push once  polyethylene glycol 3350 17 Gram(s) Oral daily  senna 2 Tablet(s) Oral at bedtime    MEDICATIONS  (PRN):  acetaminophen     Tablet .. 650 milliGRAM(s) Oral every 6 hours PRN Temp greater or equal to 38C (100.4F), Mild Pain (1 - 3)

## 2024-01-18 NOTE — PROGRESS NOTE ADULT - ASSESSMENT
63 year old female with PMH of HTN, HLD presented with c/o Lt sided weakness and slurred speech. Pt  was  last seen by family 9 pm on 01/15/24 before going to bed, she woke up at 5 am on 01/16/24 with L sided weakness and slurred speech. CTH neg, CTA performed while en route demonstrating R M2 occlusion. CT head/perfusion scan ED showed Mild cortical enhancement right frontal parietal junction corresponding to an area of ischemia. EMS had documented NIH score 8; however, on arrival to ED it was noticed to be 2 mild facial droop/lt sided weakness which improved further in the ED. Neurology admitted her to ICU for closer monitoring. MRI brain was done and showed acute infarct of right parietal and temporal area with petechial hemorrhagic conversion which has remained stable on repeat CTH. Downgraded to medicine service on 1/17/24.     #. Acute right parietal and temporal ischemic stroke with hemorrhagic conversion.   Repeat CT Head today stable.  s/p CHLOE: No AMBER thrombus. Agitated saline injection reveals bubbles in the left heart, but is inconclusive for the presence of an intracardial shunt.  Will need MRI for shunt as an outpatient   ILR tomorrow   Hypercoagulable work up, age appropriate screening and CT Chest / Abd / Pelvis to r/o malignancy as an outpatient   Start Aspirin 81 mg daily   Continue Lipitor   Neuro recs noted     #. HTN  BP normal   Monitor closely and resume home meds if SBP > 150    #. HLD  Continue Lipitor     DVT Prophylaxis -- Lovenox SQ    Dispo: Home tomorrow.    Updated family at bedside.

## 2024-01-18 NOTE — PROGRESS NOTE ADULT - ASSESSMENT
ASSESSMENT: Patient is a 63 year old female with PMHx of HTN,HLD who presented to [facility] on 1/16/24 with as per ED note left sided weakness and slurred speech. As per ED note patient was last seen by family 9:00 pm on 1/15/24 upon going to bed. Patient woke up the morning of 1/16/24 at 5:00 am with left sided weakness and slurred speech. [facility] mobile stroke unit transported patient and non-con CTH was negative and CTA performed while en route demonstrated right M2 occlusion. CTP in [facility] ED showed mild cortical enhancement of theright frontal parietal junction corresponding to an area of ischemia seen Per ED,EMS documented NIHSS score of 8, however upon arrival to [facility] NIHSS was a 2. Upon stroke team evaluation patient complained of right hand numbness and complete resolution of left sided weakness and dysarthria. Patient not a tenecteplase or mechanical thrombectomy candidate due to improving symptoms and almost back to baseline. Patient admitted to neuro ICU for further monitoring for change in neurological exam. MRI brain w/out revealed small acute cortically based infarcts in the right parietal and temporal lobes as well as the right insular cortex. Also noted mild hemorrhagic conversion of the right parietal cortically based infarct. Did not note any large intracranial hemorrhage or vasogenic edema. Rpt head CT 1/18/24 read as stable mild petechial hemorrhage in the right frontal lobe.    Etiology embolic stroke of undetermined source.    NEURO:   -Neurologically with on going improvement   - CT head 1/16/24 revealed foci of poor discrimination of the gray-white matter junction in the right cerebral hemisphere, compatible with acute infarctions as seen on prior MRI. Also noted faint ill-defined hyperattenuating focus about the right frontal lobe infarction that is likely petechial hemorrhage versus focal hemorrhagic conversion  -CT Head 1/18/24 showed Stable mild petechial hemorrhage in the right frontal lobe.  -Continue close monitoring for neurologic deterioration    -Stroke neuro checks q 4 hours  -Appears to be neurologically tolerating -150s. Can maintain this as goal and avoid rapid fluctuations and hypotension.    -ANTITHROMBOTIC THERAPY: Can restart ASA 81mg PO daily starting 1/18/24   -Titrate statin to LDL goal less than 70. LDL 44  -Dysphagia screen: Pass. advance as tolerated.  -Physical therapy/OT/Speech eval/treatment.     -CARDIOVASCULAR:   -TTE completed, EF 55-60%  -F/u CHLOE results  -Cardiac monitoring w/ telemetry for now, further evaluation pending findings of noted workup w/ consideration of eventual ILR for further evaluation of embolic source                 -HEMATOLOGY:   -H/H 11.6/35.5. Platelets 182.   -Patient should have all age and risk appropriate malignancy screenings with PCP or sooner if clinically suspected   -Consideration of CT Chest Abdomen & Pelvis for evaluation of malignancy as source of potential hypercoagulable state and patient 63 years old based on findings of w/u in progress  -hypercoag w/u pending results of noted w/u   -US LE Duplex w/ no evidence of DVT     DVT ppx: Heparin s.c [] LMWH [x] - can resume DVT ppx as of 1/18/24    PULMONARY:   -CXR noting no focal consolidation. Did note a 7 mm dense nodule at the right lung base may represent a granuloma. A noncontrast CT could be performed for confirmation. Pulmonary f/u  -Protecting airway, saturating well     RENAL:   -BUN/Cr 13.1/0.71  -Monitor urine output, maintain adequate hydration       Na Goal:  135-145    ID:   -Afebrile, no leukocytosis, monitor for si/sx of infection     OTHER: condition and plan of care d/w patient, questions and concerns addressed.     DISPOSITION: Rehab or home depending on PT eval once stable and workup is complete    CORE MEASURES:        Admission NIHSS: 2     Tenecteplase : [] YES [x] NO      LDL/HDL/A1C: 44/44/5.5%     Depression Screen- if depression hx and/or present      Statin Therapy: pending      Dysphagia Screen: [x] PASS [] FAIL     Smoking [] YES [x] NO      Afib [] YES [x] NO     Stroke Education [] YES [] NO- pending and ordered     Obtain screening lower extremity venous ultrasound in patients who meet 1 or more of the following criteria as patient is high risk for DVT/PE on admission:   [] History of DVT/PE  []Hypercoagulable states (Factor V Leiden, Cancer, OCP, etc. )  []Prolonged immobility (hemiplegia/hemiparesis/post operative or any other extended immobilization)  [] Transferred from outside facility (Rehab or Long term care)  [] Age </= to 50   ASSESSMENT: Patient is a 63 year old female with PMHx of HTN,HLD who presented to [facility] on 1/16/24 with as per ED note left sided weakness and slurred speech. As per ED note patient was last seen by family 9:00 pm on 1/15/24 upon going to bed. Patient woke up the morning of 1/16/24 at 5:00 am with left sided weakness and slurred speech. [facility] mobile stroke unit transported patient and non-con CTH was negative and CTA performed while en route demonstrated right M2 occlusion. CTP in [facility] ED showed mild cortical enhancement of theright frontal parietal junction corresponding to an area of ischemia seen Per ED,EMS documented NIHSS score of 8, however upon arrival to [facility] NIHSS was a 2. Upon stroke team evaluation patient complained of right hand numbness and complete resolution of left sided weakness and dysarthria. Patient not a tenecteplase or mechanical thrombectomy candidate due to improving symptoms and almost back to baseline. Patient admitted to neuro ICU for further monitoring for change in neurological exam. MRI brain w/out revealed small acute cortically based infarcts in the right parietal and temporal lobes as well as the right insular cortex. Also noted mild hemorrhagic conversion of the right parietal cortically based infarct. Did not note any large intracranial hemorrhage or vasogenic edema. Rpt head CT 1/18/24 read as stable mild petechial hemorrhage in the right frontal lobe.    Etiology embolic stroke of undetermined source.    NEURO:   -Neurologically with on going improvement   - CT head 1/16/24 revealed foci of poor discrimination of the gray-white matter junction in the right cerebral hemisphere, compatible with acute infarctions as seen on prior MRI. Also noted faint ill-defined hyperattenuating focus about the right frontal lobe infarction that is likely petechial hemorrhage versus focal hemorrhagic conversion  -CT Head 1/18/24 showed Stable mild petechial hemorrhage in the right frontal lobe.  -Continue close monitoring for neurologic deterioration    -Stroke neuro checks q 4 hours  -Appears to be neurologically tolerating -150s. Can maintain this as goal and avoid rapid fluctuations and hypotension.    -ANTITHROMBOTIC THERAPY: Can restart ASA 81mg PO daily starting 1/18/24   -Titrate statin to LDL goal less than 70. LDL 44  -Dysphagia screen: Pass. Advance as tolerated.  -Physical therapy/OT/Speech eval/treatment.     -CARDIOVASCULAR:   -TTE completed, EF 55-60%  -F/u CHLOE results  -Cardiac monitoring w/ telemetry for now, further evaluation pending findings of noted workup w/ consideration of eventual ILR for further evaluation of embolic source                 -HEMATOLOGY:   -H/H 11.6/35.5. Platelets 182.   -Patient should have all age and risk appropriate malignancy screenings with PCP or sooner if clinically suspected   -Consideration of CT Chest Abdomen & Pelvis for evaluation of malignancy as source of potential hypercoagulable state and patient 63 years old based on findings of w/u in progress  -hypercoag w/u pending results of noted w/u   -US LE Duplex w/ no evidence of DVT     DVT ppx: Heparin s.c [] LMWH [x] - can resume DVT ppx as of 1/18/24    PULMONARY:   -CXR noting no focal consolidation. Did note a 7 mm dense nodule at the right lung base may represent a granuloma. A noncontrast CT could be performed for confirmation. Pulmonary f/u  -Protecting airway, saturating well     RENAL:   -BUN/Cr 13.1/0.71  -Monitor urine output, maintain adequate hydration       Na Goal:  135-145    ID:   -Afebrile, no leukocytosis, monitor for si/sx of infection     OTHER: condition and plan of care d/w patient, questions and concerns addressed.     DISPOSITION: Rehab or home depending on PT eval once stable and workup is complete    CORE MEASURES:        Admission NIHSS: 2     Tenecteplase : [] YES [x] NO      LDL/HDL/A1C: 44/44/5.5%     Depression Screen- if depression hx and/or present      Statin Therapy: pending      Dysphagia Screen: [x] PASS [] FAIL     Smoking [] YES [x] NO      Afib [] YES [x] NO     Stroke Education [] YES [] NO- pending and ordered     Obtain screening lower extremity venous ultrasound in patients who meet 1 or more of the following criteria as patient is high risk for DVT/PE on admission:   [] History of DVT/PE  []Hypercoagulable states (Factor V Leiden, Cancer, OCP, etc. )  []Prolonged immobility (hemiplegia/hemiparesis/post operative or any other extended immobilization)  [] Transferred from outside facility (Rehab or Long term care)  [] Age </= to 50

## 2024-01-18 NOTE — PROGRESS NOTE ADULT - ASSESSMENT
{\rtf1\zgvqwj43438\ansi\awytflj4934\ftnbj\uc1\deff0  {\fonttbl{\f0 \fnil Segoe UI;}{\f1 \fnil \fcharset0 Segoe UI;}{\f2 \fnil Times New Blanco;}}  {\colortbl ;\pdk299\sfdsf096\rnff391 ;\red0\green0\blue0 ;\red0\green0\ubhl241 ;\red0\green0\blue0 ;}  {\stylesheet{\f0\fs20 Normal;}{\cs1 Default Paragraph Font;}{\cs2\f0\fs16 Line Number;}{\cs3\f2\fs24\ul\cf3 Hyperlink;}}  {\*\revtbl{Unknown;}}  \gpzkdb87992\deciiv78283\jxaxa2673\fedgr6433\qgmsh2134\ydimd3142\ewpvvas822\ivnjucm852\nogrowautofit\xcwcuu826\formshade\nofeaturethrottle1\dntblnsbdb\fet4\aendnotes\aftnnrlc\pgbrdrhead\pgbrdrfoot  \sectd\vtvroq14339\qhspjy48319\guttersxn0\bdfujnpf8170\ybriyous3468\mpmzmlsv8439\vofaakba3350\epfkfyc541\ldeffge965\sbkpage\pgncont\pgndec  \plain\plain\f0\fs24\ql\plain\f0\fs24\plain\f1\fs16\nxsw4188\hich\f1\dbch\f1\loch\f1\cf2\fs16 63 year old female with PMH of HTN,HLP presents with c/o Lt sided weakness, with slurred speech. Pt  was  last seen by family 9 pm last night upon going to bed,   she woke up this morning at 5 am with L sided weakness and slurred speech. [facility] mobile stroke unit transported pt, non-con CTH neg, CTA performed while en route demonstrating R M2 occlusion. CT head/perfusion scan ED showed Mild cortical enhancement right   frontal parietal junction corresponding to an area of ischemia seen  .CT PERFUSION: Moderate right parietal ischemia . No core infarct..Per ED,EMS documented NIH score 8,on arrival 2 ,mild rt facial droop/lt sided weakness which is resolved now.  Exam   in neuro ICU,  provided, patient feeling well, denies headache, dizziness, chest pain, palpitations, sob, pnd, orthopnea, cough, chest congestion, n/v/d/c/abd pain, cramps, weakness, numbing and tingling, or any other pain.   Alverto negative for   clot or pfo\par  \par  \plain\f1\fs16\dnjd7342\hich\f1\dbch\f1\loch\f1\cf2\fs16\strike\plain\f1\fs16\srhb5440\hich\f1\dbch\f1\loch\f1\cf2\fs16\plain\f0\fs20\hqmq8157\hich\f0\dbch\f0\loch\f0\fs20\par  }

## 2024-01-18 NOTE — CHART NOTE - NSCHARTNOTEFT_GEN_A_CORE
Spoke to patient regarding elective MDT ILR insertion with [place] [employer] Kristine ID#[id number]  She is agreeable to ILR insertion and monitoring.   Will arrange for ILR insertion tomorrow 1/19/2024.  Patient does not need to fast for this procedure.

## 2024-01-18 NOTE — PROGRESS NOTE ADULT - ASSESSMENT
Now s/p CHLOE, tolerated well, arrived to recovery in NAD and HDS, normal LVEF, mild VHD, agitated saline injection reveals bubbles in the left heart, but is inconclusive for the presence of an intracardial shunt, plan for transfer back to in-patient unit after recovery.    -Formal CHLOE report in EMR  -Post CHLOE management/monitoring per protocol  -Maintain NPO until fully alert  -Transfer back to in-patient unit after recovery       CHLOE CONCLUSIONS:   1. Left ventricular systolic function is normal with an ejection fraction visually estimated at 55 to 60 %.   2. Normal right ventricular cavity size and normal systolic function.   3. No evidence of left atrial or left atrial appendage thrombus. The left atrial appendage emptying velocity is normal.   4. Mild mitral regurgitation.   5. Mild aortic regurgitation.   6. Agitated saline injection reveals bubbles in the left heart, but is inconclusive for the presence of an intracardial shunt.   7. No pericardial effusion seen.   8. Findings were discussed with cardiology rounding team on 1/18/2024 at 1400.

## 2024-01-18 NOTE — DISCHARGE NOTE NURSING/CASE MANAGEMENT/SOCIAL WORK - PATIENT PORTAL LINK FT
You can access the FollowMyHealth Patient Portal offered by [facility] by registering at the following website: [email or web address]. By joining [employer]’s FollowMyHealth portal, you will also be able to view your health information using other applications (apps) compatible with our system.

## 2024-01-18 NOTE — PROGRESS NOTE ADULT - PROBLEM SELECTOR PLAN 1
Small acute cortically based infarcts in the right parietal and temporal   lobes as well as the right insular cortex. Mild hemorrhagic conversion   right parietal cortically based infarct  EF normal   for ILR tomorrow  continue statin   b/p controlled 122/72  Start asa when ok with neuro  no further cardio work up, will sign off

## 2024-01-19 ENCOUNTER — TRANSCRIPTION ENCOUNTER (OUTPATIENT)
Age: 64
End: 2024-01-19

## 2024-01-19 LAB
GLUCOSE BLDC GLUCOMTR-MCNC: 108 MG/DL — HIGH (ref 70–99)
GLUCOSE BLDC GLUCOMTR-MCNC: 20 MG/DL — CRITICAL LOW (ref 70–99)
GLUCOSE BLDC GLUCOMTR-MCNC: 93 MG/DL — SIGNIFICANT CHANGE UP (ref 70–99)

## 2024-01-19 PROCEDURE — G0452: CPT | Mod: 26

## 2024-01-19 PROCEDURE — 71275 CT ANGIOGRAPHY CHEST: CPT | Mod: 26

## 2024-01-19 PROCEDURE — 99232 SBSQ HOSP IP/OBS MODERATE 35: CPT

## 2024-01-19 PROCEDURE — 74177 CT ABD & PELVIS W/CONTRAST: CPT | Mod: 26

## 2024-01-19 PROCEDURE — 33285 INSJ SUBQ CAR RHYTHM MNTR: CPT

## 2024-01-19 PROCEDURE — 99233 SBSQ HOSP IP/OBS HIGH 50: CPT | Mod: FS

## 2024-01-19 RX ORDER — CEFAZOLIN SODIUM 1 G
2000 VIAL (EA) INJECTION ONCE
Refills: 0 | Status: COMPLETED | OUTPATIENT
Start: 2024-01-19 | End: 2024-01-19

## 2024-01-19 RX ORDER — CEFAZOLIN SODIUM 1 G
2000 VIAL (EA) INJECTION ONCE
Refills: 0 | Status: DISCONTINUED | OUTPATIENT
Start: 2024-01-19 | End: 2024-01-19

## 2024-01-19 RX ADMIN — POLYETHYLENE GLYCOL 3350 17 GRAM(S): 17 POWDER, FOR SOLUTION ORAL at 12:12

## 2024-01-19 RX ADMIN — Medication 81 MILLIGRAM(S): at 12:11

## 2024-01-19 RX ADMIN — Medication 2000 MILLIGRAM(S): at 12:08

## 2024-01-19 RX ADMIN — ATORVASTATIN CALCIUM 20 MILLIGRAM(S): 80 TABLET, FILM COATED ORAL at 22:02

## 2024-01-19 RX ADMIN — SENNA PLUS 2 TABLET(S): 8.6 TABLET ORAL at 22:01

## 2024-01-19 RX ADMIN — ENOXAPARIN SODIUM 40 MILLIGRAM(S): 100 INJECTION SUBCUTANEOUS at 22:03

## 2024-01-19 NOTE — DISCHARGE NOTE PROVIDER - NSDCFUADDAPPT_GEN_ALL_CORE_FT
Continue Asprin till 1/22.  Start Eliquis from 1/23/24 as recommended. If any neuro symptoms then notify PMD or Neuro.     Follow up with Cardio to rule out intracardiac shunt with MRI.     Repeat CT Chest in 8 weeks for Right Lung Opacity.     Recommend outpatient follow-up with Dr. Moscoso for loop recorder wound check. Our office will contact you in 3-5 days to schedule this appointment.   Please call [phone] with questions or concerns.

## 2024-01-19 NOTE — PROGRESS NOTE ADULT - SUBJECTIVE AND OBJECTIVE BOX
Procedure: ILR implant   Electrophysiologist: Dr. Sal Moscoso    Pt doing well s/p loop recorder implant. Denies complaint.     Incision: Dermabond and dressing C/D/I; no bleeding, hematoma, erythema, exudate or edema    Plan:   Resume PO intake.   Ambulate w/ assist, then progress as tolerated.     Pain control with PO analgesia PRN.   Resume home medications.   D/C home once all criteria met, with outpt f/up in 2-3 weeks for wound check.      Procedure: MDT ILR implant   Electrophysiologist: Dr. Sal Moscoso    Pt doing well s/p loop recorder implant. Denies complaint.     Incision: Dermabond and dressing C/D/I; no bleeding, hematoma, erythema, exudate or edema    Plan:   Resume PO intake.   Ambulate w/ assist, then progress as tolerated.     Pain control with PO analgesia PRN.   Resume home medications.   D/C home once all criteria met, with outpt f/up in 2-3 weeks for wound check.

## 2024-01-19 NOTE — PROGRESS NOTE ADULT - ASSESSMENT
63 year old female with PMH of HTN, HLD presented with c/o Lt sided weakness and slurred speech. Pt  was  last seen by family 9 pm on 01/15/24 before going to bed, she woke up at 5 am on 01/16/24 with L sided weakness and slurred speech. CTH neg, CTA performed while en route demonstrating R M2 occlusion. CT head/perfusion scan ED showed Mild cortical enhancement right frontal parietal junction corresponding to an area of ischemia. EMS had documented NIH score 8; however, on arrival to ED it was noticed to be 2 mild facial droop/lt sided weakness which improved further in the ED. Neurology admitted her to ICU for closer monitoring. MRI brain was done and showed acute infarct of right parietal and temporal area with petechial hemorrhagic conversion which has remained stable on repeat CTH. Downgraded to medicine service on 1/17/24.     #. Acute right parietal and temporal ischemic stroke with hemorrhagic conversion.   Repeat CT Head today stable.  s/p CHLOE: No AMBER thrombus. Agitated saline injection reveals bubbles in the left heart, but is inconclusive for the presence of an intracardial shunt.  Will need MRI for shunt as an outpatient   s/p ILR today    CT Chest / Abd / Pelvis and CTA Chest noted  Hypercoagulable work up ordered  Hem Consulted for recommendations regarding AC  Discussed with Neuro, can start AC on 1/23/24  Continue Aspirin and Lipitor   Neuro recs noted     #. HTN  BP normal   Monitor closely and resume home meds if SBP > 150    #. HLD  Continue Lipitor     #. Suspected Right Renal Infarct   Hypercoagulable work up  She will be on AC for PE starting 1/23/24    #. Right Lung Opacity  No fever, cough or shortness of breath  Monitor  Repeat CT Chest in 8 weeks    DVT Prophylaxis -- Lovenox SQ    Dispo: Home likely tomorrow.    Updated family at bedside.    63 year old female with PMH of HTN, HLD presented with c/o Lt sided weakness and slurred speech. Pt  was  last seen by family 9 pm on 01/15/24 before going to bed, she woke up at 5 am on 01/16/24 with L sided weakness and slurred speech. CTH neg, CTA performed while en route demonstrating R M2 occlusion. CT head/perfusion scan ED showed Mild cortical enhancement right frontal parietal junction corresponding to an area of ischemia. EMS had documented NIH score 8; however, on arrival to ED it was noticed to be 2 mild facial droop/lt sided weakness which improved further in the ED. Neurology admitted her to ICU for closer monitoring. MRI brain was done and showed acute infarct of right parietal and temporal area with petechial hemorrhagic conversion which has remained stable on repeat CTH. Downgraded to medicine service on 1/17/24.     #. Acute right parietal and temporal ischemic stroke with hemorrhagic conversion.   Repeat CT Head today stable.  s/p CHLOE: No AMBER thrombus. Agitated saline injection reveals bubbles in the left heart, but is inconclusive for the presence of an intracardial shunt.  Will need MRI for shunt as an outpatient   s/p ILR today    CT Chest / Abd / Pelvis and CTA Chest noted  Hypercoagulable work up ordered  Hem Consulted for recommendations regarding AC  Discussed with Neuro, can start AC on 1/23/24  Continue Aspirin and Lipitor   Neuro recs noted     #. HTN  BP normal   Monitor closely and resume home meds if SBP > 150    #. HLD  Continue Lipitor     #. RLL PE  No respiratory symptoms   Hypercoagulable work up ordered  Hem Consulted for recommendations regarding AC  Discussed with Neuro, can start AC on 1/23/24    #. Suspected Right Renal Infarct   Hypercoagulable work up  She will be on AC for PE starting 1/23/24    #. Right Lung Opacity  No fever, cough or shortness of breath  Monitor  Repeat CT Chest in 8 weeks    DVT Prophylaxis -- Lovenox SQ    Dispo: Home likely tomorrow.    Updated family at bedside.

## 2024-01-19 NOTE — DISCHARGE NOTE PROVIDER - NSDCCPTREATMENT_GEN_ALL_CORE_FT
PRINCIPAL PROCEDURE  Procedure: Insertion, implantable loop recorder  Findings and Treatment: Loop Recorder Incision Care:     - Do not touch the incision until it is completely healed.   - Keep the incision dry for 24 hours.   - There is Dermabond (skin glue) on your incision, which will start to flake off on its own over the next 2-3 weeks. Do not pick at or peel off the Dermabond.   - Do not apply soaps, creams, lotions, ointments or powders to the incision until it is completely healed.  - You should call the doctor if you notice redness, drainage, swelling, increased tenderness, hot sensation around the incision, bleeding or incision edges pulling apart.

## 2024-01-19 NOTE — DISCHARGE NOTE PROVIDER - HOSPITAL COURSE
63 year old female with PMH of HTN, HLD presented with c/o Lt sided weakness and slurred speech. Pt  was  last seen by family 9 pm on 01/15/24 before going to bed, she woke up at 5 am on 01/16/24 with L sided weakness and slurred speech. CTH neg, CTA performed while en route demonstrating R M2 occlusion. CT head/perfusion scan ED showed Mild cortical enhancement right frontal parietal junction corresponding to an area of ischemia. EMS had documented NIH score 8; however, on arrival to ED it was noticed to be 2 mild facial droop/lt sided weakness which improved further in the ED. Neurology admitted her to ICU for closer monitoring. MRI brain was done and showed acute infarct of right parietal and temporal area with petechial hemorrhagic conversion which has remained stable on repeat CTH. Downgraded to medicine service on 1/17/24.   She was monitored closely on the floor. No new or worsening neuro symptoms. Had CHLOE which ruled out AMBER thrombus. CHLOE was inconclusive for the presence of intracardiac shunt. Cardio recommended MRI as an outpatient. EP placed ILR on 1/19/24.  On further Stroke work up, she was found to have RLL Subsegmental Pulmonary Embolism, no respiratory symptoms. Also found to have suspected Right Renal Infarct. Discussed with Hematology and they recommended outpatient Hypercoagulable work up and starting Eliquis or Xarelto once cleared by Neuro.    Per Neuro, patient can start anticoagulation on 1/23/24 with close monitoring.   She is feeling better and is stable for discharge.

## 2024-01-19 NOTE — PROGRESS NOTE ADULT - NS ATTEND OPT1 GEN_ALL_CORE
I independently performed the documented:
I attest my time as attending is greater than 50% of the total combined time spent on qualifying patient care activities by the PA/NP and attending.
I independently performed the documented:
I independently performed the documented:

## 2024-01-19 NOTE — DISCHARGE NOTE PROVIDER - PROVIDER TOKENS
PROVIDER:[TOKEN:[[id number]]] PROVIDER:[TOKEN:[[id number]],FOLLOWUP:[2 weeks]],PROVIDER:[TOKEN:[[id number]],FOLLOWUP:[1 month]],PROVIDER:[TOKEN:[[id number]],FOLLOWUP:[2 weeks]],PROVIDER:[TOKEN:[[id number]],FOLLOWUP:[1 week],ESTABLISHEDPATIENT:[T]]

## 2024-01-19 NOTE — DISCHARGE NOTE PROVIDER - CARE PROVIDER_API CALL
Sal Moscoso  Cardiac Electrophysiology  [address] [place] [address]  Phone: [phone]  Fax: [phone]  Follow Up Time:    Sal Moscoso  Cardiac Electrophysiology  [address] [place] [address]  Phone: [phone]  Fax: [phone]  Follow Up Time: 2 weeks    Eric Bentley  Neurology  [address] [place] [address]  Phone: [phone]  Fax: [phone]  Follow Up Time: 1 month    Valeriy Constantino  Hematology  [address] [place] [address]  Phone: [phone]  Fax: [phone]  Follow Up Time: 2 weeks    Briana Peres [facility] Medicine  [address] [place] [address] [place] [address]  Phone: [phone]  Fax: [phone]  Established Patient  Follow Up Time: 1 week

## 2024-01-19 NOTE — PROGRESS NOTE ADULT - NS ATTEND AMEND GEN_ALL_CORE FT
Agree with PA's assessment and plan.
63 year old female with PMH of HTN,HLP presents with c/o Lt sided weakness, with slurred speech. Pt  was  last seen by family 9 pm last night upon going to bed, she woke up this morning at 5 am with L sided weakness and slurred speech. [facility] mobile stroke unit transported pt, non-con CTH neg, CTA performed while en route demonstrating R M2 occlusion. CT head/perfusion scan ED showed Mild cortical enhancement right frontal parietal junction corresponding to an area of ischemia seen  .CT PERFUSION: Moderate right parietal ischemia     1) Acute CVA s/p CHLOE which shows no evidence of AMBER thrombus and  Agitated saline injection reveals bubbles in the left heart, but is inconclusive for the presence of an intracardial shunt. will consider outpatient cmri to assess for shunting and will have EP eval for ILR   Post ILR patient can be discharged     on ASA and Lipitor 20mg     will sign off     Beckie Estrella D.O. [facility]  Cardiology/Vascular Cardiology -[facility]iology   Telephone # [phone]

## 2024-01-19 NOTE — DISCHARGE NOTE PROVIDER - NSDCMRMEDTOKEN_GEN_ALL_CORE_FT
losartan: 50 milligram(s) orally once a day  simvastatin: 10 milligram(s) orally once a day   aspirin 81 mg oral delayed release tablet: 1 tab(s) orally once a day  atorvastatin 20 mg oral tablet: 1 tab(s) orally once a day (at bedtime)  Eliquis Starter Pack for Treatment of DVT and PE 5 mg oral tablet: 2 tab(s) orally 2 times a day 2 tabs twice a day for 7 days then 1 tab twice a day. *PLEASE START ON 1/23/24*  losartan 50 mg oral tablet: 1 tab(s) orally once a day Monitor BP, resume if SBP remains &gt; 120.

## 2024-01-19 NOTE — DISCHARGE NOTE PROVIDER - NSDCFUADDINST_GEN_ALL_CORE_FT
Recommend outpatient follow-up with Dr. Moscoso for loop recorder wound check. Our office will contact you in 3-5 days to schedule this appointment.   Please call [phone] with questions or concerns. Loop Recorder Incision Care:    - Do not touch the incision until it is completely healed.  - Keep the incision dry for 24 hours.  - There is Dermabond (skin glue) on your incision, which will start to flake off on its own over the next 2-3 weeks. Do not pick at or peel off the Dermabond.  - Do not apply soaps, creams, lotions, ointments or powders to the incision until it is completely healed. - You should call the doctor if you notice redness, drainage, swelling, increased tenderness, hot sensation around the incision, bleeding or incision edges pulling apart.

## 2024-01-19 NOTE — PROGRESS NOTE ADULT - SUBJECTIVE AND OBJECTIVE BOX
Cerebral infarction    HPI:   63 year old female with PMH of HTN,HLP presents with c/o Lt sided weakness, with slurrey speech. Pt  was  last seen by family 9 pm last night upon going to bed, she woke up this morning at 5 am with L sided weakness and slurred speech. [facility] mobile stroke unit transported pt, non-con CTH neg, CTA performed while en route demonstrating R M2 occlusion. CT head/perfusion scan ED showed Mild cortical enhancement   right frontal parietal junction corresponding to an area of ischemia seen.CT PERFUSION: Moderate right parietal ischemia . No core infarct..Per ED,EMS documented NIH score 8,on arrival 2 ,mild rt facial droop/lt sided weekness which is resolved now.Ed spoke with Neurology team.Pt is AAOX3.C/O mild rt sided headache, clear speech, denies any weakness/numbness. Pt reports complained with medicines. (16 Jan 2024 12:30)    Interval History:  Patient was seen and examined at bedside around 12:30 pm with  - Ember.  Doing well.   Denies chest pain, palpitations, shortness of breath, headache, dizziness, visual symptoms, difficulty speaking/swallowing, arm/leg weakness, paresthesia, nausea, vomiting or abdominal pain.    ROS:  As per interval history otherwise unremarkable.    PHYSICAL EXAM:  Vital Signs   T(C): 37 (19 Jan 2024 12:00), Max: 37.1 (18 Jan 2024 16:36)  T(F): 98.6 (19 Jan 2024 12:00), Max: 98.8 (18 Jan 2024 16:36)  HR: 88 (19 Jan 2024 12:00) (60 - 88)  BP: 122/84 (19 Jan 2024 12:00) (122/62 - 138/64)  RR: 18 (19 Jan 2024 12:00) (16 - 18)  SpO2: 99% (19 Jan 2024 12:00) (94% - 99%)  Parameters below as of 19 Jan 2024 12:00  Patient On (Oxygen Delivery Method): room air  General: Elderly female lying in bed comfortably. No acute distress  HEENT: EOMI. Clear conjunctivae. Moist mucus membrane  Neck: Supple.   Chest: Good air entry. No wheezing, rales or rhonchi.   Heart: Normal S1 & S2. RRR.   Abdomen: Non distended. Soft. Non-tender. + BS  Ext: No pedal edema. No calf tenderness   Neuro: Awake and alert. Motor 5/5 in all extremities. Sensation intact. Reflexes 1+. Cerebellar signs intact. Speech clear.   Skin: Warm and Dry  Psychiatry: Normal mood and affect    I&O's Summary    18 Jan 2024 07:01  -  19 Jan 2024 07:00  --------------------------------------------------------  IN: 770 mL / OUT: 0 mL / NET: 770 mL    19 Jan 2024 07:01  -  19 Jan 2024 16:06  --------------------------------------------------------  IN: 700 mL / OUT: 0 mL / NET: 700 mL    LABS:                        11.6   6.62  )-----------( 182      ( 18 Jan 2024 05:30 )             35.5     01-18    139  |  103  |  13.1  ----------------------------<  89  3.4<L>   |  25.0  |  0.71    Ca    8.7      18 Jan 2024 05:30  Phos  3.2     01-18  Mg     2.1     01-18    RADIOLOGY & ADDITIONAL STUDIES:  Reviewed    MEDICATIONS  (STANDING):  aspirin enteric coated 81 milliGRAM(s) Oral daily  atorvastatin 20 milliGRAM(s) Oral at bedtime  dextrose 50% Injectable 12.5 Gram(s) IV Push once  dextrose 50% Injectable 25 Gram(s) IV Push once  dextrose 50% Injectable 25 Gram(s) IV Push once  enoxaparin Injectable 40 milliGRAM(s) SubCutaneous every 24 hours  polyethylene glycol 3350 17 Gram(s) Oral daily  senna 2 Tablet(s) Oral at bedtime    MEDICATIONS  (PRN):  acetaminophen     Tablet .. 650 milliGRAM(s) Oral every 6 hours PRN Temp greater or equal to 38C (100.4F), Mild Pain (1 - 3)  ondansetron Injectable 4 milliGRAM(s) IV Push every 6 hours PRN Nausea and/or Vomiting

## 2024-01-19 NOTE — PROGRESS NOTE ADULT - SUBJECTIVE AND OBJECTIVE BOX
Preliminary note, official recommendations pending attending review/signature     [facility] Stroke Team  Progress Note      ID: Deferred to pt's nephew, Jigar, at bedside per pt's request  HPI:  Patient is a 63 year old female with PMHx of HTN,HLD who presented to [facility] on 1/16/24 with as per ED note left sided weakness and slurred speech. As per ED note patient was last seen by family 9:00 pm on 1/15/24 upon going to bed. Patient woke up the morning of 1/16/24 at 5:00 am with left sided weakness and slurred speech. [facility] mobile stroke unit transported patient and non-con CTH was negative and CTA performed while en route demonstrated right M2 occlusion. CTP in [facility] ED showed mild cortical enhancement of theright frontal parietal junction corresponding to an area of ischemia seen Per ED,EMS documented NIHSS score of 8, however upon arrival to [facility] NIHSS was a 2. Stroke team called for consult. On presentation patient complained of right hand numbness and complete resolution of left sided weakness and dysarthria.    SUBJECTIVE: No events overnight.  Reports feeling well. No new neurologic complaints. ROS reported negative unless otherwise noted.    PMH  HTN,HLP    FH:DM  Social hx:   Denies smoking /alcohol/illicit drug uses, lives with brother and niece    MEDICATIONS:  acetaminophen     Tablet .. 650 milliGRAM(s) Oral every 6 hours PRN  aspirin enteric coated 81 milliGRAM(s) Oral daily  atorvastatin 20 milliGRAM(s) Oral at bedtime  dextrose 50% Injectable 25 Gram(s) IV Push once  dextrose 50% Injectable 12.5 Gram(s) IV Push once  dextrose 50% Injectable 25 Gram(s) IV Push once  enoxaparin Injectable 40 milliGRAM(s) SubCutaneous every 24 hours  ondansetron Injectable 4 milliGRAM(s) IV Push every 6 hours PRN  polyethylene glycol 3350 17 Gram(s) Oral daily  senna 2 Tablet(s) Oral at bedtime      Vital Signs Last 24 Hrs  T(C): 37 (19 Jan 2024 12:00), Max: 37.1 (18 Jan 2024 16:36)  T(F): 98.6 (19 Jan 2024 12:00), Max: 98.8 (18 Jan 2024 16:36)  HR: 88 (19 Jan 2024 12:00) (60 - 88)  BP: 122/84 (19 Jan 2024 12:00) (122/62 - 138/64)  BP(mean): --  RR: 18 (19 Jan 2024 12:00) (16 - 18)  SpO2: 99% (19 Jan 2024 12:00) (94% - 99%)    Parameters below as of 19 Jan 2024 12:00  Patient On (Oxygen Delivery Method): room air    PHYSICAL EXAM:  General: No acute distress  NEUROLOGICAL EXAM:  Mental status: Awake, alert, oriented x3, speech fluent, follows commands, no neglect, normal memory   Cranial Nerves: No facial asymmetry, no nystagmus, no dysarthria,  tongue midline  Motor exam: Normal tone, no drift, 5/5 RUE, 5/5 RLE, 5/5 LUE, 5/5 LLE, normal fine finger movements.  Sensation: Sensory extinction to Left leg. Intact to light touch   Coordination/ Gait: No dysmetria, gait not tested    LABS:                        11.6   6.62  )-----------( 182      ( 18 Jan 2024 05:30 )             35.5    01-18    139  |  103  |  13.1  ----------------------------<  89  3.4<L>   |  25.0  |  0.71    Ca    8.7      18 Jan 2024 05:30  Phos  3.2     01-18  Mg     2.1     01-18      IMAGING:   CHLOE W or WO Ultrasound Enhancing Agent (01.18.24 @ 12:43)  CONCLUSIONS:      1. Left ventricular systolic function is normal with an ejection fraction visually estimated at 55 to 60 %.   2. Normal right ventricular cavity size and normal systolic function.   3. No evidence of left atrial or left atrial appendage thrombus. The left atrial appendage emptying velocity is normal.   4. Mild mitral regurgitation.   5. Mild aortic regurgitation.   6. Agitated saline injection reveals bubbles in the left heart, but is inconclusive for the presence of an intracardial shunt.   7. No pericardial effusion seen.   8. Findings were discussed with cardiology rounding team on 1/18/2024 at 1400.    US Duplex Venous Lower Ext Complete, Bilateral (01.17.24 @ 12:26)  IMPRESSION:  No evidence of deep venous thrombosis in either lower extremity.    CT Head No Cont (01.18.24 @ 08:02)  IMPRESSION: Stable acute right frontal lobe, right temporal lobe, and   right posterior insular cortex infarctions. Stable mild petechial   hemorrhage in the right frontal lobe.    TTE W or WO Ultrasound Enhancing Agent (01.17.24 @ 15:43)  CONCLUSIONS:   1. Normal left ventricular diastolic function, with normal filling pressure.   2. Normal right ventricular cavity size,wall thickness, and normal systolic function.   3. No pericardial effusion seen.   4. Agitated saline injection was negative for intracardiac shunt.   5. No prior echocardiogram is available for comparison.   6. Technically difficult image quality.    Left ventricular systolic function is normal with an ejection fraction visually estimated at 55 to 60%.    MR Head No Cont (01.17.24 @ 02:08)   IMPRESSION:  Small acute cortically based infarcts in the right parietal and temporal   lobes as well as the right insular cortex. Mild hemorrhagic conversion   right parietal cortically based infarct. No large intracranial hemorrhage   or vasogenic edema.    CT Head No Cont (01.17.24 @ 08:09)   IMPRESSION:  1.  Foci of poor discrimination of the gray-white matter junction in the   right cerebral hemisphere, compatible with acute infarctions as seen on   prior MRI.  2.  Faint ill-defined hyperattenuating focus about the right frontal lobe   infarction, more conspicuous when compared to prior CT imaging, likely   petechial hemorrhage versus focal hemorrhagic conversion. Recommend   follow-up imaging.    CT Brain/CTP Stroke Protocol (01.16.24 @ 11:08)   IMPRESSION:  HEAD CT: IV contrast present from recent CTA. Mild cortical enhancement   right frontal parietal junction corresponding to an area of ischemia seen   on CT perfusion. No large intracranial hemorrhage.  CT PERFUSION: Moderate right parietal ischemia of 31 mL. No core infarct.    As per mobile stroke unit: -" non-con head neg and CTA demonstrating right M2 occlusion      [facility] Stroke Team  Progress Note      ID: Deferred to pt's nephew, Jigar, at bedside per pt's request  HPI:  Patient is a 63 year old female with PMHx of HTN,HLD who presented to [facility] on 1/16/24 with as per ED note left sided weakness and slurred speech. As per ED note patient was last seen by family 9:00 pm on 1/15/24 upon going to bed. Patient woke up the morning of 1/16/24 at 5:00 am with left sided weakness and slurred speech. [facility] mobile stroke unit transported patient and non-con CTH was negative and CTA performed while en route demonstrated right M2 occlusion. CTP in [facility] ED showed mild cortical enhancement of theright frontal parietal junction corresponding to an area of ischemia seen Per ED,EMS documented NIHSS score of 8, however upon arrival to [facility] NIHSS was a 2. Stroke team called for consult. On presentation patient complained of right hand numbness and complete resolution of left sided weakness and dysarthria.    SUBJECTIVE: No events overnight.  Reports feeling well. No new neurologic complaints. ROS reported negative unless otherwise noted.    PMH  HTN,HLP    FH:DM  Social hx:   Denies smoking /alcohol/illicit drug uses, lives with brother and niece    MEDICATIONS:  acetaminophen     Tablet .. 650 milliGRAM(s) Oral every 6 hours PRN  aspirin enteric coated 81 milliGRAM(s) Oral daily  atorvastatin 20 milliGRAM(s) Oral at bedtime  dextrose 50% Injectable 25 Gram(s) IV Push once  dextrose 50% Injectable 12.5 Gram(s) IV Push once  dextrose 50% Injectable 25 Gram(s) IV Push once  enoxaparin Injectable 40 milliGRAM(s) SubCutaneous every 24 hours  ondansetron Injectable 4 milliGRAM(s) IV Push every 6 hours PRN  polyethylene glycol 3350 17 Gram(s) Oral daily  senna 2 Tablet(s) Oral at bedtime      Vital Signs Last 24 Hrs  T(C): 37 (19 Jan 2024 12:00), Max: 37.1 (18 Jan 2024 16:36)  T(F): 98.6 (19 Jan 2024 12:00), Max: 98.8 (18 Jan 2024 16:36)  HR: 88 (19 Jan 2024 12:00) (60 - 88)  BP: 122/84 (19 Jan 2024 12:00) (122/62 - 138/64)  BP(mean): --  RR: 18 (19 Jan 2024 12:00) (16 - 18)  SpO2: 99% (19 Jan 2024 12:00) (94% - 99%)    Parameters below as of 19 Jan 2024 12:00  Patient On (Oxygen Delivery Method): room air    PHYSICAL EXAM:  General: No acute distress  NEUROLOGICAL EXAM:  Mental status: Awake, alert, oriented x3, speech fluent, follows commands, no neglect, normal memory   Cranial Nerves: No facial asymmetry, no nystagmus, no dysarthria,  tongue midline  Motor exam: Normal tone, no drift, 5/5 RUE, 5/5 RLE, 5/5 LUE, 5/5 LLE, normal fine finger movements.  Sensation: Sensory extinction to Left leg. Intact to light touch   Coordination/ Gait: No dysmetria, gait not tested    LABS:                        11.6   6.62  )-----------( 182      ( 18 Jan 2024 05:30 )             35.5    01-18    139  |  103  |  13.1  ----------------------------<  89  3.4<L>   |  25.0  |  0.71    Ca    8.7      18 Jan 2024 05:30  Phos  3.2     01-18  Mg     2.1     01-18      IMAGING:   CHLOE W or WO Ultrasound Enhancing Agent (01.18.24 @ 12:43)  CONCLUSIONS:      1. Left ventricular systolic function is normal with an ejection fraction visually estimated at 55 to 60 %.   2. Normal right ventricular cavity size and normal systolic function.   3. No evidence of left atrial or left atrial appendage thrombus. The left atrial appendage emptying velocity is normal.   4. Mild mitral regurgitation.   5. Mild aortic regurgitation.   6. Agitated saline injection reveals bubbles in the left heart, but is inconclusive for the presence of an intracardial shunt.   7. No pericardial effusion seen.   8. Findings were discussed with cardiology rounding team on 1/18/2024 at 1400.    US Duplex Venous Lower Ext Complete, Bilateral (01.17.24 @ 12:26)  IMPRESSION:  No evidence of deep venous thrombosis in either lower extremity.    CT Head No Cont (01.18.24 @ 08:02)  IMPRESSION: Stable acute right frontal lobe, right temporal lobe, and   right posterior insular cortex infarctions. Stable mild petechial   hemorrhage in the right frontal lobe.    TTE W or WO Ultrasound Enhancing Agent (01.17.24 @ 15:43)  CONCLUSIONS:   1. Normal left ventricular diastolic function, with normal filling pressure.   2. Normal right ventricular cavity size,wall thickness, and normal systolic function.   3. No pericardial effusion seen.   4. Agitated saline injection was negative for intracardiac shunt.   5. No prior echocardiogram is available for comparison.   6. Technically difficult image quality.    Left ventricular systolic function is normal with an ejection fraction visually estimated at 55 to 60%.    MR Head No Cont (01.17.24 @ 02:08)   IMPRESSION:  Small acute cortically based infarcts in the right parietal and temporal   lobes as well as the right insular cortex. Mild hemorrhagic conversion   right parietal cortically based infarct. No large intracranial hemorrhage   or vasogenic edema.    CT Head No Cont (01.17.24 @ 08:09)   IMPRESSION:  1.  Foci of poor discrimination of the gray-white matter junction in the   right cerebral hemisphere, compatible with acute infarctions as seen on   prior MRI.  2.  Faint ill-defined hyperattenuating focus about the right frontal lobe   infarction, more conspicuous when compared to prior CT imaging, likely   petechial hemorrhage versus focal hemorrhagic conversion. Recommend   follow-up imaging.    CT Brain/CTP Stroke Protocol (01.16.24 @ 11:08)   IMPRESSION:  HEAD CT: IV contrast present from recent CTA. Mild cortical enhancement   right frontal parietal junction corresponding to an area of ischemia seen   on CT perfusion. No large intracranial hemorrhage.  CT PERFUSION: Moderate right parietal ischemia of 31 mL. No core infarct.    As per mobile stroke unit: -" non-con head neg and CTA demonstrating right M2 occlusion

## 2024-01-19 NOTE — PROGRESS NOTE ADULT - PROVIDER SPECIALTY LIST ADULT
Hospitalist
Cardiology
Electrophysiology
Hospitalist
Intervent Cardiology
Neurology
Neurology
Hospitalist
Neurology

## 2024-01-19 NOTE — DISCHARGE NOTE PROVIDER - CARE PROVIDERS DIRECT ADDRESSES
,[email or web address]

## 2024-01-19 NOTE — DISCHARGE NOTE PROVIDER - NSDCQMSTROKERISK_NEU_ALL_CORE
High blood pressure/High cholesterol/History of a stroke or TIA High blood pressure/High cholesterol

## 2024-01-19 NOTE — DISCHARGE NOTE PROVIDER - NSDCCPCAREPLAN_GEN_ALL_CORE_FT
PRINCIPAL DISCHARGE DIAGNOSIS  Diagnosis: Acute cerebrovascular accident (CVA)  Assessment and Plan of Treatment: Continue medications as prescribed.  Follow up with Neuro in 3-4 weeks.      SECONDARY DISCHARGE DIAGNOSES  Diagnosis: Acute pulmonary embolism  Assessment and Plan of Treatment: Continue medications as prescribed.  Follow up with PMD in 1 week.  Follow up with Hem in 3-4 weeks.

## 2024-01-19 NOTE — PROGRESS NOTE ADULT - ASSESSMENT
ASSESSMENT: Patient is a 63 year old female with PMHx of HTN,HLD who presented to [facility] on 1/16/24 with as per ED note left sided weakness and slurred speech. As per ED note patient was last seen by family 9:00 pm on 1/15/24 upon going to bed. Patient woke up the morning of 1/16/24 at 5:00 am with left sided weakness and slurred speech. [facility] mobile stroke unit transported patient and non-con CTH was negative and CTA performed while en route demonstrated right M2 occlusion. CTP in [facility] ED showed mild cortical enhancement of theright frontal parietal junction corresponding to an area of ischemia seen Per ED,EMS documented NIHSS score of 8, however upon arrival to [facility] NIHSS was a 2. Upon stroke team evaluation patient complained of right hand numbness and complete resolution of left sided weakness and dysarthria. Patient not a tenecteplase or mechanical thrombectomy candidate due to improving symptoms and almost back to baseline. Patient admitted to neuro ICU for further monitoring for change in neurological exam. MRI brain w/out revealed small acute cortically based infarcts in the right parietal and temporal lobes as well as the right insular cortex. Also noted mild hemorrhagic conversion of the right parietal cortically based infarct. Did not note any large intracranial hemorrhage or vasogenic edema. Rpt head CT 1/18/24 read as stable mild petechial hemorrhage in the right frontal lobe.    Etiology embolic stroke of undetermined source. There is underlying concern for shunt given echo findings which should be further evaluated.     NEURO:   -Neurologically with on going improvement   - CT head 1/16/24 revealed foci of poor discrimination of the gray-white matter junction in the right cerebral hemisphere, compatible with acute infarctions as seen on prior MRI. Also noted faint ill-defined hyperattenuating focus about the right frontal lobe infarction that is likely petechial hemorrhage versus focal hemorrhagic conversion  -CT Head 1/18/24 showed Stable mild petechial hemorrhage in the right frontal lobe.  -Continue close monitoring for neurologic deterioration    -Stroke neuro checks q 4 hours  -Appears to be neurologically tolerating -150s. Can maintain this as goal and avoid rapid fluctuations and hypotension.   -ANTITHROMBOTIC THERAPY: ASA 81mg PO daily  -STATIN: Lipitor 20mg PO daily -- Titrate statin to LDL goal less than 70  -Dysphagia screen: PASS - Advance as tolerated.  -Physical therapy/OT/Speech eval/treatment.     CARDIOVASCULAR:   -TTE completed, EF 55-60%  -s/p ILR implantation 1/19/24  -CHLOE w/ finding of "Agitated saline injection reveals bubbles in the left heart, but is inconclusive for the presence of an intracardial shunt"  -Recommend CT Angio Chest w/ IV Contrast to r/o pulmonary AVM    HEMATOLOGY:   -H/H 11.6/35.5 Platelets 182    -Recommend CT Chest w/ AND w/o IV Contrast, CT Abdomen & Pelvis w/ AND w/o IV Contrast to r/o malignancy as potential contributory source, given shunt on echo can add CT Venogram w/ IV Cont  pelvis to screen for paradoxical embolism should shunt be confirmed.   -Consider hypercoag w/u  -Patient should have all age and risk appropriate malignancy screenings with PCP or sooner if clinically suspected   -US LE Duplex w/ no evidence of DVT     DVT ppx: LMWH [x]    PULMONARY:   -CXR noting no focal consolidation - 7 mm dense nodule at the right lung base may represent a granuloma, recommend pulmonary f/u  -Protecting airway, saturating well     RENAL:   -BUN/Cr 13.1/0.71  -Monitor urine output, maintain adequate hydration       Na Goal:  135-145    ID:   -Afebrile, no leukocytosis, monitor for si/sx of infection     OTHER: condition and plan of care d/w patient and family at bedside , questions and concerns addressed.     DISPOSITION: Rehab or home depending on PT eval once stable and workup is complete    CORE MEASURES:        Admission NIHSS: 2     Tenecteplase : [] YES [x] NO      LDL/HDL/A1C: 44/44/5.5%     Depression Screen- if depression hx and/or present      Statin Therapy: pending      Dysphagia Screen: [x] PASS [] FAIL     Smoking [] YES [x] NO      Afib [] YES [x] NO     Stroke Education [] YES [] NO- pending and ordered     Obtain screening lower extremity venous ultrasound in patients who meet 1 or more of the following criteria as patient is high risk for DVT/PE on admission:   [] History of DVT/PE  []Hypercoagulable states (Factor V Leiden, Cancer, OCP, etc. )  []Prolonged immobility (hemiplegia/hemiparesis/post operative or any other extended immobilization)  [] Transferred from outside facility (Rehab or Long term care)  [] Age </= to 50 ASSESSMENT: Patient is a 63 year old female with PMHx of HTN,HLD who presented to [facility] on 1/16/24 with as per ED note left sided weakness and slurred speech. As per ED note patient was last seen by family 9:00 pm on 1/15/24 upon going to bed. Patient woke up the morning of 1/16/24 at 5:00 am with left sided weakness and slurred speech. [facility] mobile stroke unit transported patient and non-con CTH was negative and CTA performed while en route demonstrated right M2 occlusion. CTP in [facility] ED showed mild cortical enhancement of theright frontal parietal junction corresponding to an area of ischemia seen Per ED,EMS documented NIHSS score of 8, however upon arrival to [facility] NIHSS was a 2. Upon stroke team evaluation patient complained of right hand numbness and complete resolution of left sided weakness and dysarthria. Patient not a tenecteplase or mechanical thrombectomy candidate due to improving symptoms and almost back to baseline. Patient admitted to neuro ICU for further monitoring for change in neurological exam. MRI brain w/out revealed small acute cortically based infarcts in the right parietal and temporal lobes as well as the right insular cortex. Also noted mild hemorrhagic conversion of the right parietal cortically based infarct. Did not note any large intracranial hemorrhage or vasogenic edema. Rpt head CT 1/18/24 read as stable mild petechial hemorrhage in the right frontal lobe.    Etiology embolic stroke of undetermined source. There is underlying concern for shunt given echo findings which should be further evaluated.     NEURO:   -Neurologically with on going improvement   - CT head 1/16/24 revealed foci of poor discrimination of the gray-white matter junction in the right cerebral hemisphere, compatible with acute infarctions as seen on prior MRI. Also noted faint ill-defined hyperattenuating focus about the right frontal lobe infarction that is likely petechial hemorrhage versus focal hemorrhagic conversion  -CT Head 1/18/24 showed Stable mild petechial hemorrhage in the right frontal lobe.  -Continue close monitoring for neurologic deterioration    -Stroke neuro checks q 4 hours  -Appears to be neurologically tolerating -150s. Can maintain this as goal and avoid rapid fluctuations and hypotension.   -ANTITHROMBOTIC THERAPY: ASA 81mg PO daily  -STATIN: Lipitor 20mg PO daily -- Titrate statin to LDL goal less than 70  -Dysphagia screen: PASS - Advance as tolerated.  -Physical therapy/OT/Speech eval/treatment.     CARDIOVASCULAR:   -TTE completed, EF 55-60%  -s/p ILR implantation 1/19/24  -CHLOE w/ finding of "Agitated saline injection reveals bubbles in the left heart, but is inconclusive for the presence of an intracardial shunt"  -Recommend CT Angio Chest w/ IV Contrast to r/o pulmonary AVM - prelim read showed Pulmonary emboli and renal ischemia - hypercoag state with arterial and venous strokes - recommend heme/onc consult    HEMATOLOGY:   -H/H 11.6/35.5 Platelets 182    -Recommend CT Chest w/ AND w/o IV Contrast, CT Abdomen & Pelvis w/ AND w/o IV Contrast to r/o malignancy as potential contributory source, given shunt on echo can add CT Venogram w/ IV Cont  pelvis to screen for paradoxical embolism should shunt be confirmed.   -Consider hypercoag w/u  -Patient should have all age and risk appropriate malignancy screenings with PCP or sooner if clinically suspected   -US LE Duplex w/ no evidence of DVT     DVT ppx: LMWH [x]    PULMONARY:   -CXR noting no focal consolidation - 7 mm dense nodule at the right lung base may represent a granuloma, recommend pulmonary f/u  -Protecting airway, saturating well     RENAL:   -BUN/Cr 13.1/0.71  -Monitor urine output, maintain adequate hydration       Na Goal:  135-145    ID:   -Afebrile, no leukocytosis, monitor for si/sx of infection     OTHER: condition and plan of care d/w patient and family at bedside , questions and concerns addressed.     DISPOSITION: Rehab or home depending on PT eval once stable and workup is complete    CORE MEASURES:        Admission NIHSS: 2     Tenecteplase : [] YES [x] NO      LDL/HDL/A1C: 44/44/5.5%     Depression Screen- if depression hx and/or present      Statin Therapy: pending      Dysphagia Screen: [x] PASS [] FAIL     Smoking [] YES [x] NO      Afib [] YES [x] NO     Stroke Education [] YES [] NO- pending and ordered     Obtain screening lower extremity venous ultrasound in patients who meet 1 or more of the following criteria as patient is high risk for DVT/PE on admission:   [] History of DVT/PE  []Hypercoagulable states (Factor V Leiden, Cancer, OCP, etc. )  []Prolonged immobility (hemiplegia/hemiparesis/post operative or any other extended immobilization)  [] Transferred from outside facility (Rehab or Long term care)  [] Age </= to 50

## 2024-01-20 VITALS
HEART RATE: 66 BPM | DIASTOLIC BLOOD PRESSURE: 72 MMHG | OXYGEN SATURATION: 95 % | TEMPERATURE: 98 F | SYSTOLIC BLOOD PRESSURE: 138 MMHG | RESPIRATION RATE: 17 BRPM

## 2024-01-20 LAB
APPEARANCE UR: CLEAR — SIGNIFICANT CHANGE UP
B2 GLYCOPROT1 AB SER QL: NEGATIVE — SIGNIFICANT CHANGE UP
BACTERIA # UR AUTO: NEGATIVE /HPF — SIGNIFICANT CHANGE UP
BILIRUB UR-MCNC: NEGATIVE — SIGNIFICANT CHANGE UP
CARDIOLIPIN AB SER-ACNC: NEGATIVE — SIGNIFICANT CHANGE UP
CAST: 1 /LPF — SIGNIFICANT CHANGE UP (ref 0–4)
COLOR SPEC: YELLOW — SIGNIFICANT CHANGE UP
DIFF PNL FLD: NEGATIVE — SIGNIFICANT CHANGE UP
GLUCOSE BLDC GLUCOMTR-MCNC: 148 MG/DL — HIGH (ref 70–99)
GLUCOSE BLDC GLUCOMTR-MCNC: 94 MG/DL — SIGNIFICANT CHANGE UP (ref 70–99)
GLUCOSE UR QL: NEGATIVE MG/DL — SIGNIFICANT CHANGE UP
KETONES UR-MCNC: 15 MG/DL
LEUKOCYTE ESTERASE UR-ACNC: ABNORMAL
NITRITE UR-MCNC: NEGATIVE — SIGNIFICANT CHANGE UP
PH UR: 6 — SIGNIFICANT CHANGE UP (ref 5–8)
PROT UR-MCNC: NEGATIVE MG/DL — SIGNIFICANT CHANGE UP
RBC CASTS # UR COMP ASSIST: 1 /HPF — SIGNIFICANT CHANGE UP (ref 0–4)
SP GR SPEC: 1.03 — SIGNIFICANT CHANGE UP (ref 1–1.03)
SQUAMOUS # UR AUTO: 1 /HPF — SIGNIFICANT CHANGE UP (ref 0–5)
UROBILINOGEN FLD QL: 1 MG/DL — SIGNIFICANT CHANGE UP (ref 0.2–1)
WBC UR QL: 3 /HPF — SIGNIFICANT CHANGE UP (ref 0–5)

## 2024-01-20 PROCEDURE — G1004: CPT

## 2024-01-20 PROCEDURE — 0042T: CPT | Mod: MA

## 2024-01-20 PROCEDURE — 93970 EXTREMITY STUDY: CPT

## 2024-01-20 PROCEDURE — 83735 ASSAY OF MAGNESIUM: CPT

## 2024-01-20 PROCEDURE — 99239 HOSP IP/OBS DSCHRG MGMT >30: CPT

## 2024-01-20 PROCEDURE — 80053 COMPREHEN METABOLIC PANEL: CPT

## 2024-01-20 PROCEDURE — 83036 HEMOGLOBIN GLYCOSYLATED A1C: CPT

## 2024-01-20 PROCEDURE — T1013: CPT

## 2024-01-20 PROCEDURE — 82962 GLUCOSE BLOOD TEST: CPT

## 2024-01-20 PROCEDURE — 71045 X-RAY EXAM CHEST 1 VIEW: CPT

## 2024-01-20 PROCEDURE — C1764: CPT

## 2024-01-20 PROCEDURE — 70450 CT HEAD/BRAIN W/O DYE: CPT

## 2024-01-20 PROCEDURE — 82550 ASSAY OF CK (CPK): CPT

## 2024-01-20 PROCEDURE — 84100 ASSAY OF PHOSPHORUS: CPT

## 2024-01-20 PROCEDURE — 93005 ELECTROCARDIOGRAM TRACING: CPT

## 2024-01-20 PROCEDURE — 74177 CT ABD & PELVIS W/CONTRAST: CPT

## 2024-01-20 PROCEDURE — 86147 CARDIOLIPIN ANTIBODY EA IG: CPT

## 2024-01-20 PROCEDURE — 84443 ASSAY THYROID STIM HORMONE: CPT

## 2024-01-20 PROCEDURE — 80048 BASIC METABOLIC PNL TOTAL CA: CPT

## 2024-01-20 PROCEDURE — 99285 EMERGENCY DEPT VISIT HI MDM: CPT

## 2024-01-20 PROCEDURE — 85025 COMPLETE CBC W/AUTO DIFF WBC: CPT

## 2024-01-20 PROCEDURE — 81241 F5 GENE: CPT

## 2024-01-20 PROCEDURE — 86803 HEPATITIS C AB TEST: CPT

## 2024-01-20 PROCEDURE — 85303 CLOT INHIBIT PROT C ACTIVITY: CPT

## 2024-01-20 PROCEDURE — 85307 ASSAY ACTIVATED PROTEIN C: CPT

## 2024-01-20 PROCEDURE — C8929: CPT

## 2024-01-20 PROCEDURE — 93880 EXTRACRANIAL BILAT STUDY: CPT

## 2024-01-20 PROCEDURE — 85610 PROTHROMBIN TIME: CPT

## 2024-01-20 PROCEDURE — 93312 ECHO TRANSESOPHAGEAL: CPT

## 2024-01-20 PROCEDURE — 33285 INSJ SUBQ CAR RHYTHM MNTR: CPT

## 2024-01-20 PROCEDURE — 71275 CT ANGIOGRAPHY CHEST: CPT

## 2024-01-20 PROCEDURE — 85730 THROMBOPLASTIN TIME PARTIAL: CPT

## 2024-01-20 PROCEDURE — 85306 CLOT INHIBIT PROT S FREE: CPT

## 2024-01-20 PROCEDURE — 85300 ANTITHROMBIN III ACTIVITY: CPT

## 2024-01-20 PROCEDURE — 36415 COLL VENOUS BLD VENIPUNCTURE: CPT

## 2024-01-20 PROCEDURE — 70551 MRI BRAIN STEM W/O DYE: CPT | Mod: MG

## 2024-01-20 PROCEDURE — 80061 LIPID PANEL: CPT

## 2024-01-20 PROCEDURE — 81001 URINALYSIS AUTO W/SCOPE: CPT

## 2024-01-20 PROCEDURE — 86146 BETA-2 GLYCOPROTEIN ANTIBODY: CPT

## 2024-01-20 PROCEDURE — 84484 ASSAY OF TROPONIN QUANT: CPT

## 2024-01-20 PROCEDURE — 85027 COMPLETE CBC AUTOMATED: CPT

## 2024-01-20 PROCEDURE — 85301 ANTITHROMBIN III ANTIGEN: CPT

## 2024-01-20 RX ORDER — ASPIRIN/CALCIUM CARB/MAGNESIUM 324 MG
1 TABLET ORAL
Qty: 2 | Refills: 0
Start: 2024-01-20 | End: 2024-01-21

## 2024-01-20 RX ORDER — ATORVASTATIN CALCIUM 80 MG/1
1 TABLET, FILM COATED ORAL
Qty: 30 | Refills: 0
Start: 2024-01-20 | End: 2024-02-18

## 2024-01-20 RX ORDER — LOSARTAN POTASSIUM 100 MG/1
1 TABLET, FILM COATED ORAL
Qty: 0 | Refills: 0 | DISCHARGE

## 2024-01-20 RX ADMIN — POLYETHYLENE GLYCOL 3350 17 GRAM(S): 17 POWDER, FOR SOLUTION ORAL at 11:47

## 2024-01-20 RX ADMIN — Medication 81 MILLIGRAM(S): at 11:43

## 2024-01-20 NOTE — CHART NOTE - NSCHARTNOTEFT_GEN_A_CORE
63F w/ HTN, HLD, a/w acute ischemic CVA, found to have concurrent PE and possible renal infarct. Now on ASA and statin for secondary prophylaxis, cleared for anticoagulation on 1/23 as per neurology.    There must be some suspicion for an underlying thrombophilia given the clinical circumstances, though she has other risk factors for CVA.    I typically reserve hypercoagulable work-up for outpatient, as the presence active thrombus, as well as ongoing anticoagulation may derange results. FVL, ATIII, protein C/S and some APLS testing has been sent.    No plans for further evaluation while inpatient.    No contraindication to starting DOAC (eliquis 10mg PO BID x7 days, followed by 5mg PO BID ongoing OR xarelto 15mg PO BID x21 days followed by 20mg PO daily ongoing) on 1/23 with plans on close outpatient follow-up at the [facility] in 3-4 weeks.    Full c/s to follow. Please contact me directly at [phone] if any questions.

## 2024-01-22 LAB
AT III ACT/NOR PPP CHRO: 83 % — LOW (ref 85–135)
PROT C ACT/NOR PPP: 87 % — SIGNIFICANT CHANGE UP (ref 74–150)
PROT S FREE PPP-ACNC: 59 % — LOW (ref 63–140)

## 2024-01-23 LAB
APCR PPP: 2.44 RATIO — SIGNIFICANT CHANGE UP
AT III AG PPP IA-MCNC: 24 MG/DL — SIGNIFICANT CHANGE UP (ref 19–31)

## 2024-01-23 RX ORDER — APIXABAN 2.5 MG/1
2 TABLET, FILM COATED ORAL
Qty: 148 | Refills: 0
Start: 2024-01-23 | End: 2024-02-28

## 2024-01-24 LAB — DNA PLOIDY SPEC FC-IMP: SIGNIFICANT CHANGE UP

## 2024-01-29 ENCOUNTER — OUTPATIENT (OUTPATIENT)
Dept: OUTPATIENT SERVICES | Facility: HOSPITAL | Age: 64
LOS: 1 days | Discharge: HOME | End: 2024-01-29

## 2024-01-29 DIAGNOSIS — I26.99 OTHER PULMONARY EMBOLISM WITHOUT ACUTE COR PULMONALE: ICD-10-CM

## 2024-02-01 ENCOUNTER — APPOINTMENT (OUTPATIENT)
Dept: HEMATOLOGY ONCOLOGY | Facility: CLINIC | Age: 64
End: 2024-02-01
Payer: MEDICARE

## 2024-02-01 VITALS
DIASTOLIC BLOOD PRESSURE: 67 MMHG | HEART RATE: 66 BPM | WEIGHT: 123.13 LBS | SYSTOLIC BLOOD PRESSURE: 135 MMHG | BODY MASS INDEX: 24.17 KG/M2 | OXYGEN SATURATION: 100 % | TEMPERATURE: 98.4 F | HEIGHT: 60 IN

## 2024-02-01 PROCEDURE — 99213 OFFICE O/P EST LOW 20 MIN: CPT

## 2024-02-01 NOTE — ASSESSMENT
[FreeTextEntry1] : 63F w/ HTN, HLD, a/w acute ischemic CVA, found to have concurrent PE and possible renal infarct. Now on ASA and statin for secondary prophylaxis, now on eliquis   -PE/REnal infarct: doing well on eliquis, follow up with neurology.  Follow up in 3 months for repeat hyperocagulable workup.

## 2024-02-01 NOTE — HISTORY OF PRESENT ILLNESS
[de-identified] :   63 year old female with PMH of HTN, HLD presented with c/o Lt sided weakness and slurred speech. Pt  was  last seen by family 9 pm on 01/15/24 before going to bed, she woke up at 5 am on 01/16/24 with L sided weakness and slurred speech. CTH neg, CTA performed while en route demonstrating R M2 occlusion. CT head/perfusion scan ED showed Mild cortical enhancement right frontal parietal junction corresponding to an area of ischemia. EMS had documented NIH score 8; however, on arrival to ED it was noticed to be 2 mild facial droop/lt sided weakness which improved further in the ED. Neurology admitted her to ICU for closer monitoring. MRI brain was done and showed acute infarct of right parietal and temporal area with petechial hemorrhagic conversion which has remained stable on repeat CTH. Downgraded to medicine service on 1/17/24. She was monitored closely on the floor. No new or worsening neuro symptoms. Had CHLOE which ruled out AMBER thrombus. CHLOE was inconclusive for the presence of intracardiac shunt. Cardio recommended MRI as an outpatient. EP placed ILR on 1/19/24. On further Stroke work up, she was found to have RLL Subsegmental Pulmonary Embolism, no respiratory symptoms. Also found to have suspected Right Renal Infarct. Discussed with Hematology and they recommended outpatient Hypercoagulable work up and starting Eliquis

## 2024-02-05 ENCOUNTER — NON-APPOINTMENT (OUTPATIENT)
Age: 64
End: 2024-02-05

## 2024-02-05 ENCOUNTER — APPOINTMENT (OUTPATIENT)
Dept: ELECTROPHYSIOLOGY | Facility: CLINIC | Age: 64
End: 2024-02-05
Payer: MEDICARE

## 2024-02-05 VITALS
DIASTOLIC BLOOD PRESSURE: 72 MMHG | HEART RATE: 68 BPM | SYSTOLIC BLOOD PRESSURE: 132 MMHG | BODY MASS INDEX: 23.56 KG/M2 | HEIGHT: 60 IN | WEIGHT: 120 LBS | OXYGEN SATURATION: 98 %

## 2024-02-05 DIAGNOSIS — I26.99 OTHER PULMONARY EMBOLISM W/OUT ACUTE COR PULMONALE: ICD-10-CM

## 2024-02-05 DIAGNOSIS — Z95.818 PRESENCE OF OTHER CARDIAC IMPLANTS AND GRAFTS: ICD-10-CM

## 2024-02-05 PROCEDURE — 93000 ELECTROCARDIOGRAM COMPLETE: CPT

## 2024-02-05 PROCEDURE — 99214 OFFICE O/P EST MOD 30 MIN: CPT

## 2024-02-05 NOTE — DISCUSSION/SUMMARY
[EKG obtained to assist in diagnosis and management of assessed problem(s)] : EKG obtained to assist in diagnosis and management of assessed problem(s) [FreeTextEntry1] : Ms Knox is a 64 y/o female Primarily Marshallese speaking who arrives today for post ILR implant  follow-up and wound check.  She is s/p ILR for monitoring of subclinical atrial fibrillation in the setting of cryptogenic stroke. She arrives in no acute distress.  ECG illustrates sinus rhythm with Non specific ST and T wave abnormalities. She is without symptoms c/w HF or angina. She is without acute focal deficits. Her blood pressure is controlled.    ILR remote transmission was successfully performed today. NML device function, AT/AF burden 0.0%. ILR implant site is intact without any concerning findings. Remote transmissions via monitor reinforced.  She will follow-up with her PCP and neurologist for post stroke medical management.  She can follow-up again in 6 months for an in office ILR interrogation.

## 2024-02-05 NOTE — CARDIOLOGY SUMMARY
[de-identified] : sinus rhythm  NSST and T wave abnormalities [de-identified] : TTE/CHLOE -hospital 1/2024 NML LV w/out wall motion abnormalities NP PFO [de-identified] : s/p GLORY ROSADO on 1/18/24

## 2024-02-05 NOTE — PHYSICAL EXAM
[Normal Rate] : normal [Rhythm Regular] : regular [Normal S1] : normal S1 [Normal S2] : normal S2 [No Pitting Edema] : no pitting edema present [No Abnormalities] : the abdominal aorta was not enlarged and no bruit was heard [Normal Gait] : normal gait [Normal] : no edema, no cyanosis, no clubbing, no varicosities

## 2024-02-05 NOTE — HISTORY OF PRESENT ILLNESS
[FreeTextEntry1] : Ms Knox is a 64 y/o female Primarily Moldovan speaking who arrives today for post ILR implant wound check. She is accompanied by her DTR who is also Moldovan speaking.   phone was used during the visit. She has PMH of HTN, HLD  She presented [facility] on 1/16/24 with c/o Lt sided weakness and slurred speech. CTA performed demonstrating R M2 occlusion. CT head/perfusion scan ED showed Mild cortical enhancement right frontal parietal junction corresponding to an area of ischemia. MRI brain was done and showed acute infarct of right parietal and temporal area with petechial hemorrhagic conversion which had remained stable on repeat CTH.  She Had CHLOE which ruled out AMBER thrombus. CHLOE was inconclusive for the presence of intracardiac shunt. Cardio recommended MRI as an outpatient.  She was found to have RLL Subsegmental Pulmonary Embolism, no respiratory symptoms. Also found to have suspected Right Renal Infarct. Out pt Hypercoagulable workup to follow, treatment with Eliquis initiated. She is now only on ASA   She is now s/p MDT ILR on 1/19/24 with Dr Moscoso. She is feeling well She denies chest pains or pressure. She  denies dizzy spells, feeling faint or fainting, She   denies palpitations or difficulty breathing while laying flat. She denies lower extremity swelling.

## 2024-02-06 RX ORDER — APIXABAN 5 MG/1
5 TABLET, FILM COATED ORAL
Qty: 180 | Refills: 1 | Status: ACTIVE | COMMUNITY
Start: 1900-01-01 | End: 1900-01-01

## 2024-02-08 ENCOUNTER — APPOINTMENT (OUTPATIENT)
Dept: NEUROLOGY | Facility: CLINIC | Age: 64
End: 2024-02-08
Payer: MEDICARE

## 2024-02-08 VITALS
WEIGHT: 120 LBS | HEART RATE: 60 BPM | OXYGEN SATURATION: 98 % | DIASTOLIC BLOOD PRESSURE: 78 MMHG | BODY MASS INDEX: 23.56 KG/M2 | HEIGHT: 60 IN | SYSTOLIC BLOOD PRESSURE: 132 MMHG

## 2024-02-08 DIAGNOSIS — Z82.3 FAMILY HISTORY OF STROKE: ICD-10-CM

## 2024-02-08 DIAGNOSIS — Z78.9 OTHER SPECIFIED HEALTH STATUS: ICD-10-CM

## 2024-02-08 DIAGNOSIS — I10 ESSENTIAL (PRIMARY) HYPERTENSION: ICD-10-CM

## 2024-02-08 PROCEDURE — 99205 OFFICE O/P NEW HI 60 MIN: CPT

## 2024-02-08 RX ORDER — ASPIRIN 81 MG
81 TABLET, DELAYED RELEASE (ENTERIC COATED) ORAL DAILY
Refills: 0 | Status: DISCONTINUED | COMMUNITY
End: 2024-02-08

## 2024-02-08 NOTE — REASON FOR VISIT
[Post Hospitalization] : a post hospitalization visit [Family Member] : family member [Interpreters_IDNumber] : [id number] [Interpreters_FullName] : Rizwana

## 2024-02-08 NOTE — HISTORY OF PRESENT ILLNESS
[FreeTextEntry1] : Ms. Knox, a 63-year-old female with history of HTN, HLD, recently diagnosed A Fib, who presented to [facility] on 1/16/2024 with left sided weakness and slurred speech, presents today for hospital follow up. CTA demonstrated a right M2 occlusion. MRI brain showed small acute infarcts in the right parietal and temporal lobes and the right insular cortex with mild hemorrhagic conversion of the right parietal infarct. Follow up CTH revealed stability. Bilateral carotid US and bilateral LE US were both unremarkable. On further stroke work up, she was found to have a RLL subsegmental pulmonary embolism and a suspected right renal infarct. Hypercoagulable workup was significant for low Antithrombin III Assay (83%) and a low Protein S (53%), but the patient was found to be negative for Factor V Leiden mutation. TTE was negative for an intracardiac shunt; however, CHLOE was inconclusive for the presence of an intracardiac shunt. ILR was placed on 1/18/24 by Dr. Moscoso. The patient recently followed up with cardiology and was notified of detection of an episode of A Fib on 2/6. She has also followed up with hematology since hospital discharge. Currently, she states she feels well overall. She reports experiencing palpitations 3 days ago, lasting about an hour before resolving. She denies recurrent episodes since. She also notes intermittent right-sided headaches, pulsating in nature, which occur about 2x/week, more commonly in the afternoon, and are relieved with Tylenol. She had similar headaches in the hospital that resolved but then returned after 3 days of leaving the hospital. Notes some phonophobia but denies photophobia, n/v. She is compliant with Eliquis 5 mg BID and her cholesterol medication. She denies significant bleeding. She saw her PCP and got a repeat CT of her chest yesterday. She has completed 2 weeks of home PT (3x/week) and continues to do exercises at home. She ambulates and carries out ADLs independently. 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 Denies SOB, interval stroke/TIA symptoms.

## 2024-02-08 NOTE — DISCUSSION/SUMMARY
[FreeTextEntry1] : Ms. Knox is a 63-year-old female with history of HTN, HLD, recently diagnosed A Fib who presents about 3 weeks s/p small acute right parietal, temporal lobe, and insular cortex strokes with mild hemorrhagic conversion of the right parietal lobe stroke in the setting of a right M2 occlusion. Given the patient's recent finding of A Fib, this is likely the etiology of her stroke; however, I cannot fully exclude a paradoxical embolism due to a diagnosed PE and inconclusive findings for an intracardial shunt. No focal neurological deficits on exam. Plan to continue Eliquis 5 mg BID and lipid lowering therapy for secondary stroke prevention. I have reviewed the goals of stroke risk factor modification. I counseled the patient on measures to reduce stroke risk, including the importance of medication compliance, risk factor control, exercise, and healthy diet. Patient was educated about signs and symptoms of stroke and was counseled to contact 911 upon emergence of stroke-like symptoms. She was advised to continue to follow up with cardiology, hematology, and her PCP regarding results of her recent CT chest. Given good functional recovery, she is cleared to return to work. Follow up with me in 3 months, or sooner if needed.  All of the patient's questions and concerns were addressed.

## 2024-02-08 NOTE — PHYSICAL EXAM
[FreeTextEntry1] : GENERAL PHYSICAL EXAM: GEN: no distress, normal affect  NEUROLOGICAL EXAM: Mental Status Orientation: alert and oriented to person, place, time, and situation Language: clear and fluent, intact comprehension and repetition. No dysarthria.   Cranial Nerves II: visual fields full to confrontation III, IV, VI: PERRL, EOMI V, VII: facial sensation and movement intact and symmetric VIII: hearing intact IX, X: uvula midline, soft palate elevates normally XI: BL shoulder shrug intact XII: tongue midline   Motor Shoulder abduction: 5 (R), 5 (L) UE flexion: 5 (R), 5 (L) UE extension: 5 (R), 5 (L) Hand : 5 (R), 5 (L) Hip flexion: 5 (R), 5 (L) Knee flexion: 5 (R), 5 (L) Knee extension: 5 (R), 5 (L) Dorsiflexion: 5 (R), 5 (L) Plantar flexion: 5 (R), 5 (L)   Tone and bulk are normal in upper and lower limbs No pronator drift   Sensation Intact to light touch in all 4 EXTs   Coordination Normal FTN bilaterally Able to perform rapid, alternating movements.   Gait Normal ambulation with no imbalance Negative Romberg

## 2024-02-15 ENCOUNTER — APPOINTMENT (OUTPATIENT)
Dept: CARDIOLOGY | Facility: CLINIC | Age: 64
End: 2024-02-15

## 2024-03-06 ENCOUNTER — NON-APPOINTMENT (OUTPATIENT)
Age: 64
End: 2024-03-06

## 2024-03-07 ENCOUNTER — APPOINTMENT (OUTPATIENT)
Dept: ELECTROPHYSIOLOGY | Facility: CLINIC | Age: 64
End: 2024-03-07
Payer: MEDICARE

## 2024-03-07 PROCEDURE — 93298 REM INTERROG DEV EVAL SCRMS: CPT

## 2024-03-11 ENCOUNTER — APPOINTMENT (OUTPATIENT)
Dept: NEUROLOGY | Facility: CLINIC | Age: 64
End: 2024-03-11

## 2024-03-14 ENCOUNTER — APPOINTMENT (OUTPATIENT)
Dept: CARDIOLOGY | Facility: CLINIC | Age: 64
End: 2024-03-14
Payer: MEDICARE

## 2024-03-14 VITALS — OXYGEN SATURATION: 100 % | SYSTOLIC BLOOD PRESSURE: 132 MMHG | HEART RATE: 63 BPM | DIASTOLIC BLOOD PRESSURE: 70 MMHG

## 2024-03-14 VITALS — BODY MASS INDEX: 22.38 KG/M2 | WEIGHT: 114 LBS | HEIGHT: 60 IN

## 2024-03-14 DIAGNOSIS — I63.9 CEREBRAL INFARCTION, UNSPECIFIED: ICD-10-CM

## 2024-03-14 DIAGNOSIS — E78.5 HYPERLIPIDEMIA, UNSPECIFIED: ICD-10-CM

## 2024-03-14 DIAGNOSIS — R20.9 UNSPECIFIED DISTURBANCES OF SKIN SENSATION: ICD-10-CM

## 2024-03-14 PROCEDURE — G2211 COMPLEX E/M VISIT ADD ON: CPT

## 2024-03-14 PROCEDURE — 99205 OFFICE O/P NEW HI 60 MIN: CPT

## 2024-03-14 PROCEDURE — 93000 ELECTROCARDIOGRAM COMPLETE: CPT

## 2024-03-14 RX ORDER — APIXABAN 5 MG/1
5 TABLET, FILM COATED ORAL
Qty: 60 | Refills: 2 | Status: DISCONTINUED | COMMUNITY
Start: 2024-02-08 | End: 2024-03-14

## 2024-03-14 RX ORDER — ATORVASTATIN CALCIUM 20 MG/1
20 TABLET, FILM COATED ORAL DAILY
Refills: 0 | Status: ACTIVE | COMMUNITY

## 2024-03-14 RX ORDER — LOSARTAN POTASSIUM AND HYDROCHLOROTHIAZIDE 12.5; 5 MG/1; MG/1
50-12.5 TABLET ORAL DAILY
Refills: 0 | Status: DISCONTINUED | COMMUNITY
End: 2024-03-14

## 2024-03-14 RX ORDER — METOPROLOL SUCCINATE 25 MG/1
25 TABLET, EXTENDED RELEASE ORAL
Qty: 90 | Refills: 1 | Status: ACTIVE | COMMUNITY
Start: 2024-03-14 | End: 1900-01-01

## 2024-03-14 RX ORDER — FAMOTIDINE 20 MG/1
20 TABLET, FILM COATED ORAL
Refills: 0 | Status: ACTIVE | COMMUNITY

## 2024-03-14 NOTE — PHYSICAL EXAM
[Well Developed] : well developed [Well Nourished] : well nourished [No Acute Distress] : no acute distress [Normal Venous Pressure] : normal venous pressure [Normal Conjunctiva] : normal conjunctiva [No Carotid Bruit] : no carotid bruit [Normal S1, S2] : normal S1, S2 [No Murmur] : no murmur [No Rub] : no rub [No Gallop] : no gallop [Clear Lung Fields] : clear lung fields [Good Air Entry] : good air entry [No Respiratory Distress] : no respiratory distress  [Non Tender] : non-tender [Soft] : abdomen soft [No Masses/organomegaly] : no masses/organomegaly [Normal Bowel Sounds] : normal bowel sounds [Normal Gait] : normal gait [No Edema] : no edema [No Cyanosis] : no cyanosis [No Clubbing] : no clubbing [No Varicosities] : no varicosities [No Rash] : no rash [No Skin Lesions] : no skin lesions [Moves all extremities] : moves all extremities [No Focal Deficits] : no focal deficits [Normal Speech] : normal speech [Alert and Oriented] : alert and oriented [Normal memory] : normal memory

## 2024-03-14 NOTE — HISTORY OF PRESENT ILLNESS
[FreeTextEntry1] : Very pleasant 62 yo woman with a history of HTN, HLD who was hospitalized at [facility] with a stroke, luckily no longer having residual deficits. No etiology was found in the hospital. She had a CHLOE that showed bubbles crossing but no indentified intracardiac shunt. ILR was placed. In the interim diagnosed with AFib off the ILR.   Feels well today. Started eliquis. No bleeding issues. Denies chest pains or sob. She feels palpitations often, and this scares her.   ECG today sinus rhythm.  BP well controlled.

## 2024-03-14 NOTE — DISCUSSION/SUMMARY
[FreeTextEntry1] : Ms. JAKUB RICKETTS is a very pleasant 63 year woman with a past medical history of HTN, HLD, Afib presenting for evaluation.  #Recent stroke: no longer cryptogenic as we have an etiology (A fib). See below. #AFib paroxysmal, CHADS-VASC = 4 (woman, stroke, htn) - continue eliquis - start metoprolol 25 mg daily as she is symptomatic  #HTN well controlled - stop losartan - start metoprolol   #HLD: cont atorvastatin  fu 6 mo [EKG obtained to assist in diagnosis and management of assessed problem(s)] : EKG obtained to assist in diagnosis and management of assessed problem(s)

## 2024-03-21 ENCOUNTER — APPOINTMENT (OUTPATIENT)
Dept: ELECTROPHYSIOLOGY | Facility: CLINIC | Age: 64
End: 2024-03-21
Payer: MEDICARE

## 2024-03-21 ENCOUNTER — NON-APPOINTMENT (OUTPATIENT)
Age: 64
End: 2024-03-21

## 2024-03-21 VITALS
DIASTOLIC BLOOD PRESSURE: 70 MMHG | WEIGHT: 120 LBS | OXYGEN SATURATION: 99 % | SYSTOLIC BLOOD PRESSURE: 150 MMHG | HEIGHT: 60 IN | HEART RATE: 53 BPM | BODY MASS INDEX: 23.56 KG/M2

## 2024-03-21 DIAGNOSIS — R00.2 PALPITATIONS: ICD-10-CM

## 2024-03-21 DIAGNOSIS — I48.91 UNSPECIFIED ATRIAL FIBRILLATION: ICD-10-CM

## 2024-03-21 PROCEDURE — 99214 OFFICE O/P EST MOD 30 MIN: CPT

## 2024-03-21 PROCEDURE — 93000 ELECTROCARDIOGRAM COMPLETE: CPT

## 2024-03-21 NOTE — DISCUSSION/SUMMARY
[FreeTextEntry1] : Ms. Knox is a 64 y/o female Primarily Wolof speaking who arrives today for follow up and management of AF.  She has PMH of HTN, HLD .She presented [facility] on 1/16/24 with c/o Lt sided weakness and slurred speech. CTA performed demonstrating R M2 occlusion. CT head/perfusion scan ED showed Mild cortical enhancement right frontal parietal junction corresponding to an area of ischemia. MRI brain was done and showed acute infarct of right parietal and temporal area with petechial hemorrhagic conversion which had remained stable on repeat CTH.  She Had CHLOE which ruled out AMBER thrombus. CHLOE was inconclusive for the presence of intracardiac shunt.  She was found to have RLL Subsegmental Pulmonary Embolism, no respiratory symptoms. Also found to have suspected Right Renal Infarct. Out pt Hypercoagulable workup to follow, treatment with Eliquis initiated.  ILR then notable for AF 2/6/24 lasting 1 hour 26 minutes in duration with median rate 128 bpm. She reported c/w Eliquis. Recurrent PAF noted with longest episode 7 hours 40 minutes in duration. Single conversion pause 4 seconds in duration 2/18/24.   Evaluated by Dr. Mcrae 3/14/23 and reported frequent palpitations. Metoprolol 25mg daily initiated.   Today, Ms. Knox reports she has been feeling well. Palpitations resolved after metoprolol initiated. She recalls having similar palpitations once prior to CVA. Denies dizziness, near syncope, syncope.   Recommendation:   Management of PAF discussed with patient. Symptoms have resolved with metoprolol 25mg daily. She has been tolerating Eliquis 5mg BID without c/o easy bruising, bleeding, or falls which she will continue indefinitely. Rhythm control strategies including catheter ablation versus AAT discussed. She is willing to consider ablation if AF burden rises in future. Single conversion pause. Asymptomatic likely resting. To continue to monitor for recurrent/symptomatic events. Continue close remote ILR monitoring. EP follow up in 4 months or sooner PRN.   Alanis Dietrich ANP-C   [EKG obtained to assist in diagnosis and management of assessed problem(s)] : EKG obtained to assist in diagnosis and management of assessed problem(s)

## 2024-03-21 NOTE — REVIEW OF SYSTEMS
[Feeling Fatigued] : not feeling fatigued [SOB] : no shortness of breath [Dyspnea on exertion] : not dyspnea during exertion [Chest Discomfort] : no chest discomfort [Palpitations] : palpitations [Orthopnea] : no orthopnea [Syncope] : no syncope [Easy Bleeding] : no tendency for easy bleeding [Dizziness] : no dizziness

## 2024-03-21 NOTE — HISTORY OF PRESENT ILLNESS
[FreeTextEntry1] : Ms. Knox is a 62 y/o female Primarily Croatian speaking who arrives today for follow up and management of AF.  She has PMH of HTN, HLD .She presented [facility] on 1/16/24 with c/o Lt sided weakness and slurred speech. CTA performed demonstrating R M2 occlusion. CT head/perfusion scan ED showed Mild cortical enhancement right frontal parietal junction corresponding to an area of ischemia. MRI brain was done and showed acute infarct of right parietal and temporal area with petechial hemorrhagic conversion which had remained stable on repeat CTH.  She Had CHLOE which ruled out AMBER thrombus. CHLOE was inconclusive for the presence of intracardiac shunt.  She was found to have RLL Subsegmental Pulmonary Embolism, no respiratory symptoms. Also found to have suspected Right Renal Infarct. Out pt Hypercoagulable workup to follow, treatment with Eliquis initiated.  ILR then notable for AF 2/6/24 lasting 1 hour 26 minutes in duration with median rate 128 bpm. She reported c/w Eliquis. Recurrent PAF noted with longest episode 7 hours 40 minutes in duration. Single conversion pause 4 seconds in duration 2/18/24.   Evaluated by Dr. Mcrae 3/14/23 and reported frequent palpitations. Metoprolol 25mg daily initiated.   Today, Ms. Knox reports she has been feeling well. Palpitations resolved after metoprolol initiated. She recalls having similar palpitations once prior to CVA. Denies dizziness, near syncope, syncope.

## 2024-04-08 ENCOUNTER — NON-APPOINTMENT (OUTPATIENT)
Age: 64
End: 2024-04-08

## 2024-04-09 ENCOUNTER — APPOINTMENT (OUTPATIENT)
Dept: ELECTROPHYSIOLOGY | Facility: CLINIC | Age: 64
End: 2024-04-09
Payer: MEDICARE

## 2024-04-09 PROCEDURE — 93298 REM INTERROG DEV EVAL SCRMS: CPT

## 2024-05-06 ENCOUNTER — OUTPATIENT (OUTPATIENT)
Dept: OUTPATIENT SERVICES | Facility: HOSPITAL | Age: 64
LOS: 1 days | Discharge: HOME | End: 2024-05-06

## 2024-05-06 DIAGNOSIS — I26.99 OTHER PULMONARY EMBOLISM WITHOUT ACUTE COR PULMONALE: ICD-10-CM

## 2024-05-08 ENCOUNTER — APPOINTMENT (OUTPATIENT)
Dept: NEUROLOGY | Facility: CLINIC | Age: 64
End: 2024-05-08
Payer: MEDICARE

## 2024-05-08 DIAGNOSIS — I63.9 CEREBRAL INFARCTION, UNSPECIFIED: ICD-10-CM

## 2024-05-08 PROCEDURE — 99215 OFFICE O/P EST HI 40 MIN: CPT

## 2024-05-08 PROCEDURE — G2211 COMPLEX E/M VISIT ADD ON: CPT

## 2024-05-08 RX ORDER — APIXABAN 5 MG/1
5 TABLET, FILM COATED ORAL
Qty: 90 | Refills: 1 | Status: ACTIVE | COMMUNITY
Start: 2024-05-08 | End: 1900-01-01

## 2024-05-08 RX ORDER — ATORVASTATIN CALCIUM 20 MG/1
20 TABLET, FILM COATED ORAL
Qty: 30 | Refills: 3 | Status: ACTIVE | COMMUNITY
Start: 2024-05-08 | End: 1900-01-01

## 2024-05-08 NOTE — DISCUSSION/SUMMARY
[FreeTextEntry1] : Ms. Knox is a 63-year-old female with history of HTN, HLD, A Fib who presents today almost 4 months s/p small acute right parietal, temporal lobe, and insular cortex cardioembolic stroke. She has recovered completely with no residual deficits. Plan to continue Eliquis 5 mg BID and lipid lowering therapy for secondary stroke prevention. She will continue to follow up with cardiology and EP. I counseled the patient again on measures to reduce stroke risk, including the importance of medication compliance, risk factor control, exercise, and healthy diet. Patient was reeducated about signs and symptoms of stroke and was counseled to contact 911 upon emergence of stroke-like symptoms. Follow up with me in 6 months, or sooner if needed.  All of the patient's questions and concerns were addressed.

## 2024-05-08 NOTE — HISTORY OF PRESENT ILLNESS
[FreeTextEntry1] : Ms. Knox, a 63-year-old female with history of HTN, HLD, A Fib who presents today for follow up. She has been feeling well overall. She is back to work. Continues to have occasional headaches, relieved completely with Tylenol. She was started on Metoprolol 25 mg daily, palpitations resolved. She is compliant with Eliquis 5 mg BID and atorvastatin 20 mg daily. Tolerating well, denies significant bleeding. Recently had bloodwork completed with PCP and has upcoming appt with hematology. Denies interval stroke/TIA symptoms.

## 2024-05-08 NOTE — REASON FOR VISIT
[Follow-Up: _____] : a [unfilled] follow-up visit [Other: _____] : [unfilled] [Interpreters_IDNumber] : [id number] [Interpreters_FullName] : Loco

## 2024-05-09 ENCOUNTER — APPOINTMENT (OUTPATIENT)
Dept: HEMATOLOGY ONCOLOGY | Facility: CLINIC | Age: 64
End: 2024-05-09

## 2024-05-13 ENCOUNTER — NON-APPOINTMENT (OUTPATIENT)
Age: 64
End: 2024-05-13

## 2024-05-14 ENCOUNTER — APPOINTMENT (OUTPATIENT)
Dept: ELECTROPHYSIOLOGY | Facility: CLINIC | Age: 64
End: 2024-05-14
Payer: MEDICARE

## 2024-05-14 PROCEDURE — 93298 REM INTERROG DEV EVAL SCRMS: CPT

## 2024-06-13 NOTE — OCCUPATIONAL THERAPY INITIAL EVALUATION ADULT - REHAB POTENTIAL, OT EVAL
Patient Specific Counseling (Will Not Stick From Patient To Patient): —————\\nPatient states that she is unable to sleep due to rash
Detail Level: Simple
Patient demonstrates independence with ADLs and functional transfers/none

## 2024-06-17 ENCOUNTER — NON-APPOINTMENT (OUTPATIENT)
Age: 64
End: 2024-06-17

## 2024-06-18 ENCOUNTER — APPOINTMENT (OUTPATIENT)
Dept: ELECTROPHYSIOLOGY | Facility: CLINIC | Age: 64
End: 2024-06-18
Payer: MEDICARE

## 2024-06-18 PROCEDURE — 93298 REM INTERROG DEV EVAL SCRMS: CPT

## 2024-07-13 NOTE — PROGRESS NOTE ADULT - SUBJECTIVE AND OBJECTIVE BOX
HPI   Chief Complaint   Patient presents with    Knee Pain       Patient is a 18-year-old male presenting to the emergency department for evaluation of right knee pain.  Patient states approximately 1 week ago he was playing soccer and was kicked in the right knee.  He has since had pain in it.  He states he has been using a brace and has taken over-the-counter pain medications such as Tylenol and ibuprofen with some relief in the pain.  He states he has also been wearing a knee brace that he bought from the store.  He denies any further trauma or injury.  He denies any numbness or tingling down the leg.  He states he has difficulty with flexion of the right knee.  He is able to walk but said that he walks with a limp.  He denies any pain in the right ankle, foot, or hip.                          [place] Coma Scale Score: 15                     Patient History   History reviewed. No pertinent past medical history.  History reviewed. No pertinent surgical history.  No family history on file.  Social History     Tobacco Use    Smoking status: Not on file    Smokeless tobacco: Not on file   Substance Use Topics    Alcohol use: Not on file    Drug use: Not on file       Physical Exam   ED Triage Vitals [07/13/24 1714]   Temperature Heart Rate Resp BP   36.7 °C (98.1 °F) 70 -- 132/71      Pulse Ox Temp Source Heart Rate Source Patient Position   99 % Temporal Monitor Sitting      BP Location FiO2 (%)     Left arm --       Physical Exam  Vitals and nursing note reviewed.     GENERAL APPEARANCE: This patient is in no acute respiratory distress. Awake and alert. Talking appropriately. Answering questions appropriately. No evidence of pressured speech.    VITAL SIGNS: As per the nurses' triage record.    HEENT: Normocephalic, atraumatic.    NECK:  full gross ROM during exam    MUSCULOSKELETAL: Tenderness to palpation along the right knee medial joint line as well as along the medial aspect of the right knee.  Patient states  Preliminary note, official recommendations pending attending review/signature     [facility] Stroke Team  Progress Note    ID: [id number]  HPI:  Patient is a 63 year old female with PMHx of HTN,HLD who presented to [facility] on 1/16/24 with as per ED note left sided weakness and slurred speech. As per ED note patient was last seen by family 9:00 pm on 1/15/24 upon going to bed. Patient woke up the morning of 1/16/24 at 5:00 am with left sided weakness and slurred speech. [facility] mobile stroke unit transported patient and non-con CTH was negative and CTA performed while en route demonstrated right M2 occlusion. CTP in [facility] ED showed mild cortical enhancement of theright frontal parietal junction corresponding to an area of ischemia seen Per ED,EMS documented NIHSS score of 8, however upon arrival to [facility] NIHSS was a 2. Stroke team called for consult. On presentation patient complained of right hand numbness and complete resolution of left sided weakness and dysarthria.    SUBJECTIVE: No events overnight.  Reports feeling much better. No new neurologic complaints. ROS reported negative unless otherwise noted.    PMH  HTN,HLD    FH:DM  Social hx: denies smoking /alcohol/illicit drug uses. Lives with brother and Niece    MEDICATIONS:  acetaminophen     Tablet .. 650 milliGRAM(s) Oral every 6 hours PRN  atorvastatin 20 milliGRAM(s) Oral at bedtime  dextrose 50% Injectable 12.5 Gram(s) IV Push once  dextrose 50% Injectable 25 Gram(s) IV Push once  dextrose 50% Injectable 25 Gram(s) IV Push once  polyethylene glycol 3350 17 Gram(s) Oral daily  senna 2 Tablet(s) Oral at bedtime    PHYSICAL EXAM:   Vital Signs Last 24 Hrs  T(C): 37 (18 Jan 2024 12:00), Max: 37.2 (17 Jan 2024 21:21)  T(F): 98.6 (18 Jan 2024 12:00), Max: 99 (17 Jan 2024 21:21)  HR: 67 (18 Jan 2024 13:35) (56 - 78)  BP: 130/68 (18 Jan 2024 13:35) (106/54 - 139/68)  BP(mean): 79 (17 Jan 2024 20:45) (70 - 90)  RR: 17 (18 Jan 2024 13:35) (12 - 18)  SpO2: 97% (18 Jan 2024 13:35) (95% - 100%)    Parameters below as of 18 Jan 2024 13:35  Patient On (Oxygen Delivery Method): room air    General: NAD  Detailed Neurologic Exam:    Mental status: The patient is awake and alert and has normal attention span.  The patient is fully oriented in 3 spheres. The patient is oriented to current events. The patient is able to name objects, follow commands, repeat sentences.    Cranial nerves: Pupils equal and react symmetrically to light. All visual fields intact. Extraocular motion is full with no nystagmus. There is no ptosis. Facial sensation is intact. Facial symmetry is intact. Tongue is midline.    Motor: There is normal bulk and tone.  There is no tremor.  Strength is 5/5 in the right arm. No drift   RLE: Strength 5/5 with no drift   Strength is 5/5 in the left arm and leg.    Sensation: Sensory intact in all four extremities.     Cerebellar: There is no dysmetria on finger to nose testing bilaterally.    Gait : deferred    LABS:                        11.6   6.62  )-----------( 182      ( 18 Jan 2024 05:30 )             35.5    01-18    139  |  103  |  13.1  ----------------------------<  89  3.4<L>   |  25.0  |  0.71    Ca    8.7      18 Jan 2024 05:30  Phos  3.2     01-18  Mg     2.1     01-18    TPro  7.0  /  Alb  3.6  /  TBili  0.5  /  DBili  x   /  AST  23  /  ALT  18  /  AlkPhos  101  01-17      IMAGING:  US Duplex Venous Lower Ext Complete, Bilateral (01.17.24 @ 12:26)  IMPRESSION:  No evidence of deep venous thrombosis in either lower extremity.    CT Head No Cont (01.18.24 @ 08:02)  IMPRESSION: Stable acute right frontal lobe, right temporal lobe, and   right posterior insular cortex infarctions. Stable mild petechial   hemorrhage in the right frontal lobe.    TTE W or WO Ultrasound Enhancing Agent (01.17.24 @ 15:43)    CONCLUSIONS:   1. Normal left ventricular diastolic function, with normal filling pressure.   2. Normal right ventricular cavity size,wall thickness, and normal systolic function.   3. No pericardial effusion seen.   4. Agitated saline injection was negative for intracardiac shunt.   5. No prior echocardiogram is available for comparison.   6. Technically difficult image quality.    Left ventricular systolic function is normal with an ejection fraction visually estimated at 55 to 60%.    MR Head No Cont (01.17.24 @ 02:08)   IMPRESSION:  Small acute cortically based infarcts in the right parietal and temporal   lobes as well as the right insular cortex. Mild hemorrhagic conversion   right parietal cortically based infarct. No large intracranial hemorrhage   or vasogenic edema.    CT Head No Cont (01.17.24 @ 08:09)   IMPRESSION:  1.  Foci of poor discrimination of the gray-white matter junction in the   right cerebral hemisphere, compatible with acute infarctions as seen on   prior MRI.  2.  Faint ill-defined hyperattenuating focus about the right frontal lobe   infarction, more conspicuous when compared to prior CT imaging, likely   petechial hemorrhage versus focal hemorrhagic conversion. Recommend   follow-up imaging.    CT Brain/CTP Stroke Protocol (01.16.24 @ 11:08)   IMPRESSION:  HEAD CT: IV contrast present from recent CTA. Mild cortical enhancement   right frontal parietal junction corresponding to an area of ischemia seen   on CT perfusion. No large intracranial hemorrhage.  CT PERFUSION: Moderate right parietal ischemia of 31 mL. No core infarct.    As per mobile stroke unit: -" non-con head neg and CTA demonstrating right M2 occlusion  he is unable to flex the knee due to pain and therefore is holding it in extension.  There is some swelling noted to the medial aspect of the right knee compared to the left.    NEUROLOGICAL: Awake, alert and oriented x 3.    IMMUNOLOGICAL: No palpable lymphadenopathy or lymphatic streaking noted on visible skin.    DERM: No petechiae, rashes, or ecchymoses on visible skin    PSYCH: mood and affect appear normal.      ED Course & MDM   Diagnoses as of 07/13/24 2018   Acute pain of right knee       Medical Decision Making  **Disclaimer parts of this chart have been completed using voice recognition software. Please excuse any errors of transcription.     Evaluated this patient independently and my supervising physician was available for consultation.    HPI: Detailed above.    Exam: A medically appropriate exam performed, outlined above, given the known history and presentation.    History obtained from: Patient    Labs/Diagnostics:  XR knee right 4+ views   Final Result   1.  No radiographic evidence for acute fracture .                  MACRO:   None.        Signed by: Helga Suh 7/13/2024 6:43 PM   Dictation workstation:   [id number]        EMERGENCY DEPARTMENT COURSE and DIFFERENTIAL DIAGNOSIS/MDM:  Patient is a 18-year-old male presenting to the emergency department for evaluation of right knee pain.  On physical exam vital signs remarkable for systolic hypertension but otherwise stable patient is in no acute distress.  Patient has tenderness along the right medial joint line of the knee as well as the medial aspect of the knee with some overlying swelling compared to the left knee.  Patient is unable to flex the knee due to pain.  Tylenol and ibuprofen ordered as well as x-ray of the right knee.  X-ray showed no radiographic evidence of acute fracture.  Suspect patient could have a injury in one of the ligaments of the knee.  Low suspicion for any septic joint at this time as knee is not extremely tender  to palpation with no significant swelling, erythema, warmth.  He was advised to follow-up with orthopedics outpatient within the next 1 to 2 days.  He will continue to take Tylenol and ibuprofen as needed for pain.  He will return to the emergency department with any new or worsening symptoms.    The patient presented with a chief complaint of right knee pain. The differential diagnosis associated with this patient's presentation includes fracture, dislocation, meniscus tear, ACL/MCL/PCL tear.     Vitals:    Vitals:    07/13/24 1714   BP: 132/71   BP Location: Left arm   Patient Position: Sitting   Pulse: 70   Temp: 36.7 °C (98.1 °F)   TempSrc: Temporal   SpO2: 99%   Weight: 60 kg (132 lb 4.4 oz)   Height: 1.829 m (6')     History Limited by:     Language primarily Surinamese-speaking    Independent history obtained from:    Parent    External records reviewed:    None    Diagnostics interpreted by me:    Xrays - see my independent interpretation in MDM    Discussions with other clinicians:    None    Chronic conditions impacting care:    None    Social determinants of health affecting care:    None    Diagnostic tests considered but not performed: None    ED Medications managed:    Medications   acetaminophen (Tylenol) tablet 975 mg (975 mg oral Given 7/13/24 1830)   ibuprofen tablet 400 mg (400 mg oral Given 7/13/24 1830)       Prescription drugs considered:    None    Screenings:              Procedure  Procedures     Hayde Carver PA-C  07/13/24 2018     [facility] Stroke Team  Progress Note    ID: [id number]  HPI:  Patient is a 63 year old female with PMHx of HTN,HLD who presented to [facility] on 1/16/24 with as per ED note left sided weakness and slurred speech. As per ED note patient was last seen by family 9:00 pm on 1/15/24 upon going to bed. Patient woke up the morning of 1/16/24 at 5:00 am with left sided weakness and slurred speech. [facility] mobile stroke unit transported patient and non-con CTH was negative and CTA performed while en route demonstrated right M2 occlusion. CTP in [facility] ED showed mild cortical enhancement of theright frontal parietal junction corresponding to an area of ischemia seen Per ED,EMS documented NIHSS score of 8, however upon arrival to [facility] NIHSS was a 2. Stroke team called for consult. On presentation patient complained of right hand numbness and complete resolution of left sided weakness and dysarthria.    SUBJECTIVE: No events overnight.  Reports feeling much better. No new neurologic complaints. ROS reported negative unless otherwise noted.    PMH  HTN,HLD    FH:DM  Social hx: denies smoking /alcohol/illicit drug uses. Lives with brother and Niece    MEDICATIONS:  acetaminophen     Tablet .. 650 milliGRAM(s) Oral every 6 hours PRN  atorvastatin 20 milliGRAM(s) Oral at bedtime  dextrose 50% Injectable 12.5 Gram(s) IV Push once  dextrose 50% Injectable 25 Gram(s) IV Push once  dextrose 50% Injectable 25 Gram(s) IV Push once  polyethylene glycol 3350 17 Gram(s) Oral daily  senna 2 Tablet(s) Oral at bedtime    PHYSICAL EXAM:   Vital Signs Last 24 Hrs  T(C): 37 (18 Jan 2024 12:00), Max: 37.2 (17 Jan 2024 21:21)  T(F): 98.6 (18 Jan 2024 12:00), Max: 99 (17 Jan 2024 21:21)  HR: 67 (18 Jan 2024 13:35) (56 - 78)  BP: 130/68 (18 Jan 2024 13:35) (106/54 - 139/68)  BP(mean): 79 (17 Jan 2024 20:45) (70 - 90)  RR: 17 (18 Jan 2024 13:35) (12 - 18)  SpO2: 97% (18 Jan 2024 13:35) (95% - 100%)    Parameters below as of 18 Jan 2024 13:35  Patient On (Oxygen Delivery Method): room air    General: NAD  Detailed Neurologic Exam:    Mental status: The patient is awake and alert and has normal attention span.  The patient is fully oriented in 3 spheres. The patient is oriented to current events. The patient is able to name objects, follow commands, repeat sentences.    Cranial nerves: Pupils equal and react symmetrically to light. All visual fields intact. Extraocular motion is full with no nystagmus. There is no ptosis. Facial sensation is intact. Facial symmetry is intact. Tongue is midline.    Motor: There is normal bulk and tone.  There is no tremor.  Strength is 5/5 in the right arm. No drift   RLE: Strength 5/5 with no drift   Strength is 5/5 in the left arm and leg.    Sensation: Sensory intact in all four extremities.     Cerebellar: There is no dysmetria on finger to nose testing bilaterally.    Gait : deferred    LABS:                        11.6   6.62  )-----------( 182      ( 18 Jan 2024 05:30 )             35.5    01-18    139  |  103  |  13.1  ----------------------------<  89  3.4<L>   |  25.0  |  0.71    Ca    8.7      18 Jan 2024 05:30  Phos  3.2     01-18  Mg     2.1     01-18    TPro  7.0  /  Alb  3.6  /  TBili  0.5  /  DBili  x   /  AST  23  /  ALT  18  /  AlkPhos  101  01-17      IMAGING:  US Duplex Venous Lower Ext Complete, Bilateral (01.17.24 @ 12:26)  IMPRESSION:  No evidence of deep venous thrombosis in either lower extremity.    CT Head No Cont (01.18.24 @ 08:02)  IMPRESSION: Stable acute right frontal lobe, right temporal lobe, and   right posterior insular cortex infarctions. Stable mild petechial   hemorrhage in the right frontal lobe.    TTE W or WO Ultrasound Enhancing Agent (01.17.24 @ 15:43)    CONCLUSIONS:   1. Normal left ventricular diastolic function, with normal filling pressure.   2. Normal right ventricular cavity size,wall thickness, and normal systolic function.   3. No pericardial effusion seen.   4. Agitated saline injection was negative for intracardiac shunt.   5. No prior echocardiogram is available for comparison.   6. Technically difficult image quality.    Left ventricular systolic function is normal with an ejection fraction visually estimated at 55 to 60%.    MR Head No Cont (01.17.24 @ 02:08)   IMPRESSION:  Small acute cortically based infarcts in the right parietal and temporal   lobes as well as the right insular cortex. Mild hemorrhagic conversion   right parietal cortically based infarct. No large intracranial hemorrhage   or vasogenic edema.    CT Head No Cont (01.17.24 @ 08:09)   IMPRESSION:  1.  Foci of poor discrimination of the gray-white matter junction in the   right cerebral hemisphere, compatible with acute infarctions as seen on   prior MRI.  2.  Faint ill-defined hyperattenuating focus about the right frontal lobe   infarction, more conspicuous when compared to prior CT imaging, likely   petechial hemorrhage versus focal hemorrhagic conversion. Recommend   follow-up imaging.    CT Brain/CTP Stroke Protocol (01.16.24 @ 11:08)   IMPRESSION:  HEAD CT: IV contrast present from recent CTA. Mild cortical enhancement   right frontal parietal junction corresponding to an area of ischemia seen   on CT perfusion. No large intracranial hemorrhage.  CT PERFUSION: Moderate right parietal ischemia of 31 mL. No core infarct.    As per mobile stroke unit: -" non-con head neg and CTA demonstrating right M2 occlusion

## 2024-07-25 ENCOUNTER — NON-APPOINTMENT (OUTPATIENT)
Age: 64
End: 2024-07-25

## 2024-07-25 ENCOUNTER — APPOINTMENT (OUTPATIENT)
Dept: ELECTROPHYSIOLOGY | Facility: CLINIC | Age: 64
End: 2024-07-25
Payer: MEDICARE

## 2024-07-25 VITALS
HEIGHT: 60 IN | WEIGHT: 120 LBS | BODY MASS INDEX: 23.56 KG/M2 | SYSTOLIC BLOOD PRESSURE: 130 MMHG | OXYGEN SATURATION: 99 % | HEART RATE: 56 BPM | DIASTOLIC BLOOD PRESSURE: 70 MMHG

## 2024-07-25 DIAGNOSIS — I48.91 UNSPECIFIED ATRIAL FIBRILLATION: ICD-10-CM

## 2024-07-25 DIAGNOSIS — I63.9 CEREBRAL INFARCTION, UNSPECIFIED: ICD-10-CM

## 2024-07-25 DIAGNOSIS — Z95.818 PRESENCE OF OTHER CARDIAC IMPLANTS AND GRAFTS: ICD-10-CM

## 2024-07-25 PROCEDURE — 93000 ELECTROCARDIOGRAM COMPLETE: CPT

## 2024-07-25 PROCEDURE — 99214 OFFICE O/P EST MOD 30 MIN: CPT

## 2024-07-31 NOTE — CARDIOLOGY SUMMARY
[de-identified] : CTA performed demonstrating R M2 occlusion. CT head/perfusion scan ED showed Mild cortical enhancement right frontal parietal junction corresponding to an area of ischemia. MRI brain was done and showed acute infarct of right parietal and temporal area with petechial hemorrhagic conversion which had remained stable on repeat CTH. [de-identified] : d CHLOE which ruled out AMBER thrombus. CHLOE was inconclusive for the presence of intracardiac shunt. She was found to have RLL Subsegmental Pulmonary Embolism

## 2024-07-31 NOTE — HISTORY OF PRESENT ILLNESS
[FreeTextEntry1] : Ms. Knox is a 64 y/o female Primarily Icelandic speaking who arrives today for follow up and management of AF.  She has PMH of HTN, HLD .She presented [facility] on 1/16/24 with c/o Lt sided weakness and slurred speech. CTA performed demonstrating R M2 occlusion. CT head/perfusion scan ED showed Mild cortical enhancement right frontal parietal junction corresponding to an area of ischemia. MRI brain was done and showed acute infarct of right parietal and temporal area with petechial hemorrhagic conversion which had remained stable on repeat CTH. CHLOE was inconclusive for the presence of intracardiac shunt and RLL Subsegmental Pulmonary Embolism, no respiratory symptoms. Also found to have suspected Right Renal Infarct. Out pt Hypercoagulable workup to follow, treatment with Eliquis initiated.  ILR then notable for AF 2/6/24 lasting 1 hour 26 minutes in duration with median rate 128 bpm. She reported c/w Eliquis. Recurrent PAF noted with longest episode 7 hours 40 minutes in duration. Single conversion pause 4 seconds in duration 2/18/24. Evaluated by Dr. Mcrae 3/14/23 and reported frequent palpitations. Metoprolol 25mg daily initiated. Her symptoms then resolved. During previous f/u the consideration for future rhythm control strategies discussed.  ILR has continued to reveal low burden PAF with RVR with last episode 5/6/24 3 hours 42 minutes in duration. Baseline SB. [place] 1%.   ECG reveals SB 56 bpm. Tolerating Eliquis without c/o easy bruising, bleeding, or falls.

## 2024-07-31 NOTE — DISCUSSION/SUMMARY
[EKG obtained to assist in diagnosis and management of assessed problem(s)] : EKG obtained to assist in diagnosis and management of assessed problem(s) [FreeTextEntry1] : In summary, Ms. Knox is a 62 y/o female Primarily Lithuanian speaking  carries a PMH of HTN, HLD initally presented  [facility] on 1/[facility]/24 with c/o Lt sided weakness and slurred speech  with small acute right parietal, temporal lobe, and insular cortex cardioembolic stroke with complete resolution, RLL subsegmental PE   She Had CHLOE which ruled out AMBER thrombus. CHLOE was inconclusive for the presence of intracardiac shunt. She was found to have RLL Subsegmental Pulmonary Embolism, no respiratory symptoms. Also found to have suspected Right Renal Infarct. Out pt Hypercoagulable workup to follow, treatment with Eliquis initiated.  ILR then notable for AF 2/6/24 lasting 1 hour 26 minutes in duration with median rate 128 bpm. She reported c/w Eliquis. Recurrent PAF noted with longest episode 7 hours 40 minutes in duration. Single conversion pause 4 seconds in duration 2/18/24.  Evaluated by Dr. Mcrae 3/14/23 and reported frequent palpitations. Metoprolol 25mg daily initiated. Her symptoms then resolved.  # pAF: AF burdern < 1% ( per last interrogation of the ILR)  Continue Metoprolol 25 mg bid ( Ck if bradycardic)  Continue Eliquis ( No bleeding or bruising )   Rhythm control strategies including catheter ablation versus AAT discussed. She is willing to consider ablation if AF burden rises in future,

## 2024-07-31 NOTE — CARDIOLOGY SUMMARY
[de-identified] : CTA performed demonstrating R M2 occlusion. CT head/perfusion scan ED showed Mild cortical enhancement right frontal parietal junction corresponding to an area of ischemia. MRI brain was done and showed acute infarct of right parietal and temporal area with petechial hemorrhagic conversion which had remained stable on repeat CTH. [de-identified] : d CHLOE which ruled out AMBER thrombus. CHLOE was inconclusive for the presence of intracardiac shunt. She was found to have RLL Subsegmental Pulmonary Embolism

## 2024-07-31 NOTE — DISCUSSION/SUMMARY
[EKG obtained to assist in diagnosis and management of assessed problem(s)] : EKG obtained to assist in diagnosis and management of assessed problem(s) [FreeTextEntry1] : In summary, Ms. Knox is a 64 y/o female Primarily Welsh speaking  carries a PMH of HTN, HLD initally presented  [facility] on 1/[facility]/24 with c/o Lt sided weakness and slurred speech  with small acute right parietal, temporal lobe, and insular cortex cardioembolic stroke with complete resolution, RLL subsegmental PE   She Had CHLOE which ruled out AMBER thrombus. CHLOE was inconclusive for the presence of intracardiac shunt. She was found to have RLL Subsegmental Pulmonary Embolism, no respiratory symptoms. Also found to have suspected Right Renal Infarct. Out pt Hypercoagulable workup to follow, treatment with Eliquis initiated.  ILR then notable for AF 2/6/24 lasting 1 hour 26 minutes in duration with median rate 128 bpm. She reported c/w Eliquis. Recurrent PAF noted with longest episode 7 hours 40 minutes in duration. Single conversion pause 4 seconds in duration 2/18/24.  Evaluated by Dr. Mcrae 3/14/23 and reported frequent palpitations. Metoprolol 25mg daily initiated. Her symptoms then resolved.  # pAF: AF burdern < 1% ( per last interrogation of the ILR)  Continue Metoprolol 25 mg bid ( Ck if bradycardic)  Continue Eliquis ( No bleeding or bruising )   Rhythm control strategies including catheter ablation versus AAT discussed. She is willing to consider ablation if AF burden rises in future,

## 2024-07-31 NOTE — DISCUSSION/SUMMARY
[EKG obtained to assist in diagnosis and management of assessed problem(s)] : EKG obtained to assist in diagnosis and management of assessed problem(s) [FreeTextEntry1] : In summary, Ms. Knox is a 62 y/o female Primarily Macedonian speaking  carries a PMH of HTN, HLD initally presented  [facility] 1[facility] with c/o Lt sided weakness and slurred speech  with small acute right parietal, temporal lobe, and insular cortex cardioembolic stroke with complete resolution, RLL subsegmental PE   She Had CHLOE which ruled out AMBER thrombus. CHLOE was inconclusive for the presence of intracardiac shunt. She was found to have RLL Subsegmental Pulmonary Embolism, no respiratory symptoms. Also found to have suspected Right Renal Infarct. Out pt Hypercoagulable workup to follow, treatment with Eliquis initiated.  ILR then notable for AF 2/6/24 lasting 1 hour 26 minutes in duration with median rate 128 bpm. She reported c/w Eliquis. Recurrent PAF noted with longest episode 7 hours 40 minutes in duration. Single conversion pause 4 seconds in duration 2/18/24.  Evaluated by Dr. Mcrae 3/14/23 and reported frequent palpitations. Metoprolol 25mg daily initiated. Her symptoms then resolved.  # pAF: AF burdern < 1% ( per last interrogation of the ILR)  Continue Metoprolol 25 mg bid ( Ck if bradycardic)  Continue Eliquis ( No bleeding or bruising )   Rhythm control strategies including catheter ablation versus AAT discussed. She is willing to consider ablation if AF burden rises in future,

## 2024-07-31 NOTE — HISTORY OF PRESENT ILLNESS
[FreeTextEntry1] : Ms. Knox is a 62 y/o female Primarily Korean speaking who arrives today for follow up and management of AF.  She has PMH of HTN, HLD .She presented [facility] on 1/16/24 with c/o Lt sided weakness and slurred speech. CTA performed demonstrating R M2 occlusion. CT head/perfusion scan ED showed Mild cortical enhancement right frontal parietal junction corresponding to an area of ischemia. MRI brain was done and showed acute infarct of right parietal and temporal area with petechial hemorrhagic conversion which had remained stable on repeat CTH. CHLOE was inconclusive for the presence of intracardiac shunt and RLL Subsegmental Pulmonary Embolism, no respiratory symptoms. Also found to have suspected Right Renal Infarct. Out pt Hypercoagulable workup to follow, treatment with Eliquis initiated.  ILR then notable for AF 2/6/24 lasting 1 hour 26 minutes in duration with median rate 128 bpm. She reported c/w Eliquis. Recurrent PAF noted with longest episode 7 hours 40 minutes in duration. Single conversion pause 4 seconds in duration 2/18/24. Evaluated by Dr. Mcrae 3/14/23 and reported frequent palpitations. Metoprolol 25mg daily initiated. Her symptoms then resolved. During previous f/u the consideration for future rhythm control strategies discussed.  ILR has continued to reveal low burden PAF with RVR with last episode 5/6/24 3 hours 42 minutes in duration. Baseline SB. [place] 1%.   ECG reveals SB 56 bpm. Tolerating Eliquis without c/o easy bruising, bleeding, or falls.

## 2024-07-31 NOTE — HISTORY OF PRESENT ILLNESS
[FreeTextEntry1] : Ms. Knox is a 64 y/o female Primarily Kyrgyz speaking who arrives today for follow up and management of AF.  She has PMH of HTN, HLD .She presented [facility] on 1/16/24 with c/o Lt sided weakness and slurred speech. CTA performed demonstrating R M2 occlusion. CT head/perfusion scan ED showed Mild cortical enhancement right frontal parietal junction corresponding to an area of ischemia. MRI brain was done and showed acute infarct of right parietal and temporal area with petechial hemorrhagic conversion which had remained stable on repeat CTH. CHLOE was inconclusive for the presence of intracardiac shunt and RLL Subsegmental Pulmonary Embolism, no respiratory symptoms. Also found to have suspected Right Renal Infarct. Out pt Hypercoagulable workup to follow, treatment with Eliquis initiated.  ILR then notable for AF 2/6/24 lasting 1 hour 26 minutes in duration with median rate 128 bpm. She reported c/w Eliquis. Recurrent PAF noted with longest episode 7 hours 40 minutes in duration. Single conversion pause 4 seconds in duration 2/18/24. Evaluated by Dr. Mcrae 3/14/23 and reported frequent palpitations. Metoprolol 25mg daily initiated. Her symptoms then resolved. During previous f/u the consideration for future rhythm control strategies discussed.  ILR has continued to reveal low burden PAF with RVR with last episode 5/6/24 3 hours 42 minutes in duration. Baseline SB. [place] 1%.   ECG reveals SB 56 bpm. Tolerating Eliquis without c/o easy bruising, bleeding, or falls.

## 2024-07-31 NOTE — HISTORY OF PRESENT ILLNESS
[FreeTextEntry1] : Ms. Knox is a 64 y/o female Primarily Lithuanian speaking who arrives today for follow up and management of AF.  She has PMH of HTN, HLD .She presented [facility] on 1/16/24 with c/o Lt sided weakness and slurred speech. CTA performed demonstrating R M2 occlusion. CT head/perfusion scan ED showed Mild cortical enhancement right frontal parietal junction corresponding to an area of ischemia. MRI brain was done and showed acute infarct of right parietal and temporal area with petechial hemorrhagic conversion which had remained stable on repeat CTH. CHLOE was inconclusive for the presence of intracardiac shunt and RLL Subsegmental Pulmonary Embolism, no respiratory symptoms. Also found to have suspected Right Renal Infarct. Out pt Hypercoagulable workup to follow, treatment with Eliquis initiated.  ILR then notable for AF 2/6/24 lasting 1 hour 26 minutes in duration with median rate 128 bpm. She reported c/w Eliquis. Recurrent PAF noted with longest episode 7 hours 40 minutes in duration. Single conversion pause 4 seconds in duration 2/18/24. Evaluated by Dr. Mcrae 3/14/23 and reported frequent palpitations. Metoprolol 25mg daily initiated. Her symptoms then resolved. During previous f/u the consideration for future rhythm control strategies discussed.  ILR has continued to reveal low burden PAF with RVR with last episode 5/6/24 3 hours 42 minutes in duration. Baseline SB. [place] 1%.   ECG reveals SB 56 bpm. Tolerating Eliquis without c/o easy bruising, bleeding, or falls.

## 2024-07-31 NOTE — DISCUSSION/SUMMARY
[EKG obtained to assist in diagnosis and management of assessed problem(s)] : EKG obtained to assist in diagnosis and management of assessed problem(s) [FreeTextEntry1] : In summary, Ms. nKox is a 62 y/o female Primarily Hungarian speaking  carries a PMH of HTN, HLD initally presented  [facility] on 1/16/24 with c/o Lt sided weakness and slurred speech  with small acute right parietal, temporal lobe, and insular cortex cardioembolic stroke with complete resolution, RLL subsegmental PE   She Had CHLOE which ruled out AMBER thrombus. CHLOE was inconclusive for the presence of intracardiac shunt. She was found to have RLL Subsegmental Pulmonary Embolism, no respiratory symptoms. Also found to have suspected Right Renal Infarct. Out pt Hypercoagulable workup to follow, treatment with Eliquis initiated.  ILR then notable for AF 2/6/24 lasting 1 hour 26 minutes in duration with median rate 128 bpm. She reported c/w Eliquis. Recurrent PAF noted with longest episode 7 hours 40 minutes in duration. Single conversion pause 4 seconds in duration 2/18/24.  Evaluated by Dr. Mcrae 3/14/23 and reported frequent palpitations. Metoprolol 25mg daily initiated. Her symptoms then resolved.  # pAF: AF burdern < 1% ( per last interrogation of the ILR)  Continue Metoprolol 25 mg bid ( Ck if bradycardic)  Continue Eliquis ( No bleeding or bruising )   Rhythm control strategies including catheter ablation versus AAT discussed. She is willing to consider ablation if AF burden rises in future,

## 2024-07-31 NOTE — CARDIOLOGY SUMMARY
[de-identified] : CTA performed demonstrating R M2 occlusion. CT head/perfusion scan ED showed Mild cortical enhancement right frontal parietal junction corresponding to an area of ischemia. MRI brain was done and showed acute infarct of right parietal and temporal area with petechial hemorrhagic conversion which had remained stable on repeat CTH. [de-identified] : d CHLOE which ruled out AMBER thrombus. CHLOE was inconclusive for the presence of intracardiac shunt. She was found to have RLL Subsegmental Pulmonary Embolism

## 2024-07-31 NOTE — CARDIOLOGY SUMMARY
[de-identified] : CTA performed demonstrating R M2 occlusion. CT head/perfusion scan ED showed Mild cortical enhancement right frontal parietal junction corresponding to an area of ischemia. MRI brain was done and showed acute infarct of right parietal and temporal area with petechial hemorrhagic conversion which had remained stable on repeat CTH. [de-identified] : d CHLOE which ruled out AMBER thrombus. CHLOE was inconclusive for the presence of intracardiac shunt. She was found to have RLL Subsegmental Pulmonary Embolism

## 2024-08-05 ENCOUNTER — APPOINTMENT (OUTPATIENT)
Dept: ELECTROPHYSIOLOGY | Facility: CLINIC | Age: 64
End: 2024-08-05

## 2024-08-26 ENCOUNTER — NON-APPOINTMENT (OUTPATIENT)
Age: 64
End: 2024-08-26

## 2024-08-27 ENCOUNTER — APPOINTMENT (OUTPATIENT)
Dept: ELECTROPHYSIOLOGY | Facility: CLINIC | Age: 64
End: 2024-08-27
Payer: MEDICARE

## 2024-08-27 PROCEDURE — 93298 REM INTERROG DEV EVAL SCRMS: CPT

## 2024-09-19 ENCOUNTER — NON-APPOINTMENT (OUTPATIENT)
Age: 64
End: 2024-09-19

## 2024-09-19 ENCOUNTER — APPOINTMENT (OUTPATIENT)
Dept: CARDIOLOGY | Facility: CLINIC | Age: 64
End: 2024-09-19
Payer: MEDICARE

## 2024-09-19 VITALS
HEART RATE: 57 BPM | OXYGEN SATURATION: 99 % | BODY MASS INDEX: 24.54 KG/M2 | DIASTOLIC BLOOD PRESSURE: 76 MMHG | WEIGHT: 125 LBS | SYSTOLIC BLOOD PRESSURE: 128 MMHG | HEIGHT: 60 IN

## 2024-09-19 DIAGNOSIS — I63.9 CEREBRAL INFARCTION, UNSPECIFIED: ICD-10-CM

## 2024-09-19 DIAGNOSIS — I10 ESSENTIAL (PRIMARY) HYPERTENSION: ICD-10-CM

## 2024-09-19 DIAGNOSIS — I48.91 UNSPECIFIED ATRIAL FIBRILLATION: ICD-10-CM

## 2024-09-19 PROCEDURE — 93000 ELECTROCARDIOGRAM COMPLETE: CPT

## 2024-09-19 PROCEDURE — 99214 OFFICE O/P EST MOD 30 MIN: CPT

## 2024-09-19 NOTE — DISCUSSION/SUMMARY
[Patient] : the patient [Risks] : risks [Benefits] : benefits [Alternatives] : alternatives [FreeTextEntry1] : Ms. JAKUB RICKETTS is a very pleasant 63 year woman with a past medical history of HTN, HLD, CVA, Afib s/p ILR for long term arrythmia monitoring - 1/19/2024 who now returns for follow up office visit accompanied by her niece (Alanis Antoine). Reports of feeling good. Denies chest pain, shortness of breath, palpitations, syncope or near syncope. Compliant with medications. No bleeding episodes from AC. She works as  and she does [employer] at home. No activity intolerance. No leg edema. States she had Stress Test from her previous cardiologist about 4 years ago.  A/P:  #CVA: 1/2024. On statin and AC for AFib. No recurrent stroke epsiodes.  #AFib paroxysmal, CHADS-VASC = 4 (woman, stroke, htn) - continue Eliquis 5 mg Q 12H. Tolerating well - continue metoprolol 25 mg daily   #HTN well controlled - continue with metoprolol   #HLD: cont atorvastatin  #abnormal EKG - asymptomatic - had stress test from her previous Cardiologist (Oliver Phelps, ), will request copy  f/u with Dr. Mcrae in 6 months or sooner if needed   Patient was advised to contact the office or seek emergency medical care for any new, worsening or concerning symptoms. Patient verbalized understanding and is in agreement with the above plan.  Farooq Landa, GUTIERREZ [EKG obtained to assist in diagnosis and management of assessed problem(s)] : EKG obtained to assist in diagnosis and management of assessed problem(s)

## 2024-09-19 NOTE — CARDIOLOGY SUMMARY
[de-identified] : 9/19/2024: Sinus Bradycardia  -Left atrial enlargement. -T-abnormality -Anterior/lateral leads c/w previous EKGs

## 2024-09-19 NOTE — HISTORY OF PRESENT ILLNESS
[FreeTextEntry1] : Very pleasant 62 yo woman with a history of HTN, HLD who was hospitalized at [facility] with a stroke, luckily no longer having residual deficits. No etiology was found in the hospital. She had a CHLOE that showed bubbles crossing but no indentified intracardiac shunt. ILR was placed. In the interim diagnosed with AFib off the ILR.   Feels well today. Started eliquis. No bleeding issues. Denies chest pains or sob. She feels palpitations often, and this scares her.   ECG today sinus rhythm.  BP well controlled.  9/19/2024 : [facility] (#[id number]) Returns for follow up office visit. Reports of feeling good. Denies chest pain, shortness of breath, palpitations, syncope or near syncope. Compliant with medications. No bleeding episodes from AC. She works as  and she does [employer] at home. No activity intolerance. No leg edema. States she had Stress Test from her previous cardiologist about 4 years ago.

## 2024-09-30 ENCOUNTER — NON-APPOINTMENT (OUTPATIENT)
Age: 64
End: 2024-09-30

## 2024-10-01 ENCOUNTER — APPOINTMENT (OUTPATIENT)
Dept: ELECTROPHYSIOLOGY | Facility: CLINIC | Age: 64
End: 2024-10-01
Payer: MEDICARE

## 2024-10-01 PROCEDURE — 93298 REM INTERROG DEV EVAL SCRMS: CPT

## 2024-11-05 ENCOUNTER — NON-APPOINTMENT (OUTPATIENT)
Age: 64
End: 2024-11-05

## 2024-11-05 ENCOUNTER — APPOINTMENT (OUTPATIENT)
Dept: ELECTROPHYSIOLOGY | Facility: CLINIC | Age: 64
End: 2024-11-05
Payer: MEDICARE

## 2024-11-05 PROCEDURE — 93298 REM INTERROG DEV EVAL SCRMS: CPT

## 2024-11-06 ENCOUNTER — APPOINTMENT (OUTPATIENT)
Dept: NEUROLOGY | Facility: CLINIC | Age: 64
End: 2024-11-06
Payer: MEDICARE

## 2024-11-06 VITALS
WEIGHT: 125 LBS | OXYGEN SATURATION: 96 % | BODY MASS INDEX: 24.54 KG/M2 | DIASTOLIC BLOOD PRESSURE: 75 MMHG | HEART RATE: 67 BPM | HEIGHT: 60 IN | SYSTOLIC BLOOD PRESSURE: 139 MMHG

## 2024-11-06 DIAGNOSIS — I63.9 CEREBRAL INFARCTION, UNSPECIFIED: ICD-10-CM

## 2024-11-06 PROCEDURE — 99213 OFFICE O/P EST LOW 20 MIN: CPT

## 2024-11-06 PROCEDURE — G2211 COMPLEX E/M VISIT ADD ON: CPT

## 2024-12-05 ENCOUNTER — APPOINTMENT (OUTPATIENT)
Dept: ELECTROPHYSIOLOGY | Facility: CLINIC | Age: 64
End: 2024-12-05

## 2024-12-05 VITALS
SYSTOLIC BLOOD PRESSURE: 145 MMHG | OXYGEN SATURATION: 99 % | HEART RATE: 75 BPM | WEIGHT: 125 LBS | HEIGHT: 60 IN | DIASTOLIC BLOOD PRESSURE: 75 MMHG | BODY MASS INDEX: 24.54 KG/M2

## 2024-12-05 DIAGNOSIS — Z95.818 PRESENCE OF OTHER CARDIAC IMPLANTS AND GRAFTS: ICD-10-CM

## 2024-12-05 DIAGNOSIS — I48.91 UNSPECIFIED ATRIAL FIBRILLATION: ICD-10-CM

## 2024-12-05 DIAGNOSIS — I63.9 CEREBRAL INFARCTION, UNSPECIFIED: ICD-10-CM

## 2024-12-05 PROCEDURE — 93000 ELECTROCARDIOGRAM COMPLETE: CPT

## 2024-12-05 PROCEDURE — 99215 OFFICE O/P EST HI 40 MIN: CPT

## 2024-12-10 ENCOUNTER — NON-APPOINTMENT (OUTPATIENT)
Age: 64
End: 2024-12-10

## 2025-01-06 ENCOUNTER — NON-APPOINTMENT (OUTPATIENT)
Age: 65
End: 2025-01-06

## 2025-01-06 ENCOUNTER — APPOINTMENT (OUTPATIENT)
Dept: ELECTROPHYSIOLOGY | Facility: CLINIC | Age: 65
End: 2025-01-06
Payer: MEDICARE

## 2025-01-06 PROCEDURE — 93298 REM INTERROG DEV EVAL SCRMS: CPT

## 2025-01-26 NOTE — OCCUPATIONAL THERAPY INITIAL EVALUATION ADULT - PERTINENT HX OF CURRENT PROBLEM, REHAB EVAL
Pt presents with c/o Lt sided weakness and slurred speech. CTH (-), CTA performed while en route demonstrating R M2 occlusion. CT head/perfusion scan ED showed Mild cortical enhancement   right frontal parietal junction corresponding to an area of ischemia. EMS had documented NIH score 8; however, on arrival to ED it was noticed to be 2 mild facial droop/lt sided weakness which improved further in the ED. MRI brain was done and showed acute infarct of right parietal and temporal area with petechial hemorrhagic conversion which has remained stable on repeat CTH. Patient w/ cancer

## 2025-02-10 ENCOUNTER — APPOINTMENT (OUTPATIENT)
Dept: ELECTROPHYSIOLOGY | Facility: CLINIC | Age: 65
End: 2025-02-10
Payer: MEDICARE

## 2025-02-10 ENCOUNTER — NON-APPOINTMENT (OUTPATIENT)
Age: 65
End: 2025-02-10

## 2025-02-10 PROCEDURE — 93298 REM INTERROG DEV EVAL SCRMS: CPT

## 2025-02-25 ENCOUNTER — OUTPATIENT (OUTPATIENT)
Dept: OUTPATIENT SERVICES | Facility: HOSPITAL | Age: 65
LOS: 1 days | End: 2025-02-25
Payer: MEDICARE

## 2025-02-25 DIAGNOSIS — Z98.890 OTHER SPECIFIED POSTPROCEDURAL STATES: Chronic | ICD-10-CM

## 2025-02-25 DIAGNOSIS — Z01.818 ENCOUNTER FOR OTHER PREPROCEDURAL EXAMINATION: ICD-10-CM

## 2025-02-25 LAB
ALBUMIN SERPL ELPH-MCNC: 4.3 G/DL — SIGNIFICANT CHANGE UP (ref 3.3–5.2)
ALP SERPL-CCNC: 130 U/L — HIGH (ref 40–120)
ALT FLD-CCNC: 17 U/L — SIGNIFICANT CHANGE UP
ANION GAP SERPL CALC-SCNC: 13 MMOL/L — SIGNIFICANT CHANGE UP (ref 5–17)
APTT BLD: 42.3 SEC — HIGH (ref 24.5–35.6)
AST SERPL-CCNC: 20 U/L — SIGNIFICANT CHANGE UP
BASOPHILS # BLD AUTO: 0.03 K/UL — SIGNIFICANT CHANGE UP (ref 0–0.2)
BASOPHILS NFR BLD AUTO: 0.6 % — SIGNIFICANT CHANGE UP (ref 0–2)
BILIRUB SERPL-MCNC: 0.4 MG/DL — SIGNIFICANT CHANGE UP (ref 0.4–2)
BLD GP AB SCN SERPL QL: SIGNIFICANT CHANGE UP
BUN SERPL-MCNC: 12.8 MG/DL — SIGNIFICANT CHANGE UP (ref 8–20)
CALCIUM SERPL-MCNC: 9.2 MG/DL — SIGNIFICANT CHANGE UP (ref 8.4–10.5)
CHLORIDE SERPL-SCNC: 100 MMOL/L — SIGNIFICANT CHANGE UP (ref 96–108)
CO2 SERPL-SCNC: 29 MMOL/L — SIGNIFICANT CHANGE UP (ref 22–29)
CREAT SERPL-MCNC: 0.76 MG/DL — SIGNIFICANT CHANGE UP (ref 0.5–1.3)
EGFR: 86 ML/MIN/1.73M2 — SIGNIFICANT CHANGE UP
EOSINOPHIL # BLD AUTO: 0.04 K/UL — SIGNIFICANT CHANGE UP (ref 0–0.5)
EOSINOPHIL NFR BLD AUTO: 0.7 % — SIGNIFICANT CHANGE UP (ref 0–6)
GLUCOSE SERPL-MCNC: 94 MG/DL — SIGNIFICANT CHANGE UP (ref 70–99)
HCT VFR BLD CALC: 42.2 % — SIGNIFICANT CHANGE UP (ref 34.5–45)
HGB BLD-MCNC: 13.4 G/DL — SIGNIFICANT CHANGE UP (ref 11.5–15.5)
IMM GRANULOCYTES # BLD AUTO: 0.01 K/UL — SIGNIFICANT CHANGE UP (ref 0–0.07)
IMM GRANULOCYTES NFR BLD AUTO: 0.2 % — SIGNIFICANT CHANGE UP (ref 0–0.9)
INR BLD: 1.36 RATIO — HIGH (ref 0.85–1.16)
LYMPHOCYTES # BLD AUTO: 2.06 K/UL — SIGNIFICANT CHANGE UP (ref 1–3.3)
LYMPHOCYTES NFR BLD AUTO: 38.3 % — SIGNIFICANT CHANGE UP (ref 13–44)
MAGNESIUM SERPL-MCNC: 2 MG/DL — SIGNIFICANT CHANGE UP (ref 1.8–2.6)
MCHC RBC-ENTMCNC: 28.9 PG — SIGNIFICANT CHANGE UP (ref 27–34)
MCHC RBC-ENTMCNC: 31.8 G/DL — LOW (ref 32–36)
MCV RBC AUTO: 90.9 FL — SIGNIFICANT CHANGE UP (ref 80–100)
MONOCYTES # BLD AUTO: 0.36 K/UL — SIGNIFICANT CHANGE UP (ref 0–0.9)
MONOCYTES NFR BLD AUTO: 6.7 % — SIGNIFICANT CHANGE UP (ref 2–14)
NEUTROPHILS # BLD AUTO: 2.88 K/UL — SIGNIFICANT CHANGE UP (ref 1.8–7.4)
NEUTROPHILS NFR BLD AUTO: 53.5 % — SIGNIFICANT CHANGE UP (ref 43–77)
NRBC # BLD AUTO: 0 K/UL — SIGNIFICANT CHANGE UP (ref 0–0)
NRBC # FLD: 0 K/UL — SIGNIFICANT CHANGE UP (ref 0–0)
NRBC BLD AUTO-RTO: 0 /100 WBCS — SIGNIFICANT CHANGE UP (ref 0–0)
PLATELET # BLD AUTO: 201 K/UL — SIGNIFICANT CHANGE UP (ref 150–400)
PMV BLD: 11.1 FL — SIGNIFICANT CHANGE UP (ref 7–13)
POTASSIUM SERPL-MCNC: 4.4 MMOL/L — SIGNIFICANT CHANGE UP (ref 3.5–5.3)
POTASSIUM SERPL-SCNC: 4.4 MMOL/L — SIGNIFICANT CHANGE UP (ref 3.5–5.3)
PROT SERPL-MCNC: 8.4 G/DL — SIGNIFICANT CHANGE UP (ref 6.6–8.7)
PROTHROM AB SERPL-ACNC: 15.7 SEC — HIGH (ref 9.9–13.4)
RBC # BLD: 4.64 M/UL — SIGNIFICANT CHANGE UP (ref 3.8–5.2)
RBC # FLD: 13.6 % — SIGNIFICANT CHANGE UP (ref 10.3–14.5)
SODIUM SERPL-SCNC: 142 MMOL/L — SIGNIFICANT CHANGE UP (ref 135–145)
WBC # BLD: 5.38 K/UL — SIGNIFICANT CHANGE UP (ref 3.8–10.5)
WBC # FLD AUTO: 5.38 K/UL — SIGNIFICANT CHANGE UP (ref 3.8–10.5)

## 2025-02-25 PROCEDURE — 80053 COMPREHEN METABOLIC PANEL: CPT

## 2025-02-25 PROCEDURE — 83735 ASSAY OF MAGNESIUM: CPT

## 2025-02-25 PROCEDURE — G0463: CPT

## 2025-02-25 PROCEDURE — 86901 BLOOD TYPING SEROLOGIC RH(D): CPT

## 2025-02-25 PROCEDURE — 86900 BLOOD TYPING SEROLOGIC ABO: CPT

## 2025-02-25 PROCEDURE — 85610 PROTHROMBIN TIME: CPT

## 2025-02-25 PROCEDURE — 85730 THROMBOPLASTIN TIME PARTIAL: CPT

## 2025-02-25 PROCEDURE — 93010 ELECTROCARDIOGRAM REPORT: CPT

## 2025-02-25 PROCEDURE — 86850 RBC ANTIBODY SCREEN: CPT

## 2025-02-25 PROCEDURE — 85025 COMPLETE CBC W/AUTO DIFF WBC: CPT

## 2025-02-25 PROCEDURE — 93005 ELECTROCARDIOGRAM TRACING: CPT

## 2025-02-25 PROCEDURE — 36415 COLL VENOUS BLD VENIPUNCTURE: CPT

## 2025-02-25 NOTE — H&P PST ADULT - HISTORY OF PRESENT ILLNESS
Ms. Knox is a 65 y/o female Primarily Faroese speaking, with a history of PMH of HTN, HLD, cryptogenic stroke, ILR implant (1/19/2024), and paroxysmal atrial fibrillation and tachy-cherelle syndrome, now presenting for followup of AF.  ?  She presented [facility] on 1/16/24 with c/o Lt sided weakness and slurred speech. CTA performed demonstrating R M2 occlusion. CT head/perfusion scan ED showed Mild cortical enhancement right frontal parietal junction corresponding to an area of ischemia. MRI brain was done and showed acute infarct of right parietal and temporal area with petechial hemorrhagic conversion which had remained stable on repeat CTH.  ?  She Had CHLOE which ruled out AMBER thrombus. She was found to have RLL Subsegmental Pulmonary Embolism, no respiratory symptoms. Also found to have suspected Right Renal Infarct. Out pt Hypercoagulable workup to follow, treatment with Eliquis initiated.  ?  ILR implanted on 1/19/2024, and has been notable for paroxysmal AF as well as offset pauses, with longest episode 7 hours 40 minutes in duration.  Previous ILR office interrogation revealed multiple AF episodes from October and November, Longest episode 6 hours 45 mins. V rates 110's-160's.  She has had offset pauses after AF events, as well as some sinus bradycardia.  On 10/22/24 there was an 8 second conversion pause from AT/AF to SB, which occured overnight. ;  ILR interrogation today revealed 22 episodes of AF with the last AF episode occurring 11/11; in AF shge has had RVR with rates up to 180s bpm. The patient reports feeling mild palpitation and chest pain during the episodes. Overall HRs appear to be often <60 bpm on ILR.  She has been on Toprol 25mg qd, and has remained on anticoagulaiton with Eliquis 5mg bid.  ?  Today, Ms. Knox reports she has been feeling well overall. She reports episodes of minor chest pain, pressure, and palpitation typically lasting about an hour, and these episodes seem to correlate with her arrhythmic episodes. She denies dizziness, near syncope, syncope, CP, fatigue, DOMINGUEZ.  ?  She reports that she has been noted to have elevated bp at recent office visits with her other doctors but thinks this may be due to white coat syndrome.  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  ?  ECG reveals SB 59 left atrial enlargement.  Tolerating Eliquis without c/o easy bruising, bleeding, or falls. She is also currently taking metoprolol ER 25mg  ?       63 y/o female Primarily Scottish speaking, with a past medical  history of PMH of HTN, HLD, cryptogenic stroke, ILR implant (1/19/2024), and paroxysmal atrial fibrillation and tachy-cherelle syndrome,  ?She presented [facility] on 1/16/24 with c/o Lt sided weakness and slurred speech. CTA performed demonstrating R M2 occlusion. CT head/perfusion scan ED showed Mild cortical enhancement right frontal parietal junction corresponding to an area of ischemia. MRI brain was done and showed acute infarct of right parietal and temporal area with petechial hemorrhagic conversion which had remained stable on repeat CTH.  ?She Had CHLOE which ruled out AMBER thrombus. She was found to have RLL Subsegmental Pulmonary Embolism, no respiratory symptoms. Also found to have suspected Right Renal Infarct. She is on Eliquis   ILR data  notable for paroxysmal AF as well as offset pauses, with longest episode 7 hours 40 minutes in duration. ILR office interrogation revealed multiple AF episodes from October and November 2024, Longest episode 6 hours 45 mins. V rates 110's-160's.  She has had offset pauses after AF events, as well as some sinus bradycardia.  On 10/22/24 there was an 8 second conversion pause from AT/AF to SB, which occurred overnight. ;  ILR interrogation on 12/5/24 revealed 22 episodes of AF with the last AF episode occurring 11/11; in AF she has had RVR with rates up to 180s bpm. The patient reports feeling mild palpitation and chest pain during the episodes.   Overall HRs appear to be often <60 bpm on ILR.  Tolerating Eliquis without c/o easy bruising, bleeding, or falls. She is also currently taking metoprolol ER 25mg po daily   ?                                                                                 Department of Cardiology                                                                  [facility] [address] [address]                                                            Telephone: [phone]. Fax:[phone]                                                                             Pre-EP Procedure H and P      Chief complaint:    Patient is a 64y old  Female who presents with a chief complaint of Afib, for afib ablation     HPI:  63 y/o  Colombian speaking, female  with a past medical  history of PMH of HTN, HLD, cryptogenic stroke, ILR implant (1/19/2024), and paroxysmal atrial fibrillation and tachy-cherelle syndrome,( AFib paroxysmal, CHADS-VASC = 4 (woman, stroke, htn).  Hx of R M2 occlusion and acute infarct of right parietal and temporal area with petechial hemorrhagic conversion in 2024, , no residual effects, which had remained stable on repeat CTH.        She  Had CHLOE which ruled out AMBER thrombus. She was found to have RLL Subsegmental Pulmonary Embolism, no respiratory symptoms. Also found to have suspected Right Renal Infarct.(01/19/24)  She is on Eliquis   ILR data  notable for paroxysmal AF as well as offset pauses, with longest episode 7 hours 40 minutes in duration. ILR office interrogation revealed multiple AF episodes from October and November 2024, Longest episode 6 hours 45 mins. V rates 110's-160's.  She has had offset pauses after AF events, as well as some sinus bradycardia.  On 10/22/24 there was an 8 second conversion pause from AT/AF to SB, which occurred overnight. ;  ILR interrogation on 12/5/24 revealed 22 episodes of AF with the last AF episode occurring 11/11; in AF she has had RVR with rates up to 180s bpm. The patient reports feeling mild palpitation and chest pain during the episodes.   Overall HRs appear to be often <60 bpm on ILR.  Tolerating Eliquis without c/o easy bruising, bleeding, or falls. She is also currently taking metoprolol ER 25mg po daily   Her CHLOE on 01/24 revealed normal with an ejection fraction visually estimated at 55 to 60 %., mild MR, AR.  Patient has ST in the past , , report not available in the chart   Patient work as a  and denies any symptoms with physical exertion.  Patient states that she is overall feeling well. Ramiro BYRD, dizziness, SOB, CP, palpations, orthopnea near syncope or syncope.  Patient now presents to [facility] PST for her upcoming procedure of afib ablation on 03/11/25 with DR Moscoso    Heart Failure: no       Systolic/Diastolic/Combined:        NYHA Class (within 2 weeks):     Echo:     < from: CHLOE W or WO Ultrasound Enhancing Agent (01.18.24 @ 12:43) >   1. Left ventricular systolic function is normal with an ejection fraction visually estimated at 55 to 60 %.   2. Normal right ventricular cavity size and normal systolic function.   3. No evidence of left atrial or left atrial appendage thrombus. The left atrial appendage emptying velocity is normal.   4. Mild mitral regurgitation.   5. Mild aortic regurgitation.   6. Agitated saline injection reveals bubbles in the left heart, but is inconclusive for the presence of an intracardial shunt.   7. No pericardial effusion seen.   8. Findings were discussed with cardiology rounding team on 1/18/2024 at 1400.      < end of copied text >      < from: CT Abdomen and Pelvis w/ IV Cont (01.19.24 @ 14:11) >    No pulmonary AVM.    Small pulmonary thromboembolus within a subsegmental branch of the right   lower lobe. This was discussed with Dr. Spencer at 2:53 PM on 1/19/2024.    Small consolidation in the right middle lobe, which may be infectious in   the appropriate clinical setting. Recommend 8 week follow-up to assess   for change.    Wedge-shaped region of hypoenhancement in the mid right kidney,   differential for which includes an infarct or pyelonephritis. Patent   renal arteries and veins.      < end of copied text >      Prior EP Procedures: ILR implant (1/19/2024),    Prior IC Procedures:none    Prior CTS:none         Antiarrhythmic Therapies: none     Anticoagulation Therapies:    Eliquis 5mg po bid  	      PHYSICAL EXAM:  Constitutional: A & O x 3  HEENT:   Normal oral mucosa, PERRL, EOMI	  Cardiovascular: Normal S1 S2, No JVD, No murmurs  Respiratory: Lungs clear to auscultation	  Gastrointestinal:  Soft, Non-tender, + BS	  Skin: No rashes, No ecchymoses, No cyanosis  Neurologic: Non-focal   Extremities: Normal range of motion, no edema  Vascular: Peripheral pulses palpable + bilaterally                           13.4   5.38  )-----------( 201      ( 25 Feb 2025 11:10 )             42.2     02-25    142  |  100  |  12.8  ----------------------------<  94  4.4   |  29.0  |  0.76    Ca    9.2      25 Feb 2025 11:10  Mg     2.0     02-25    TPro  8.4  /  Alb  4.3  /  TBili  0.4  /  DBili  x   /  AST  20  /  ALT  17  /  AlkPhos  130[H]  02-25

## 2025-02-25 NOTE — H&P PST ADULT - SKIN/BREAST
negative Klisyri Pregnancy And Lactation Text: It is unknown if this medication can harm a developing fetus or if it is excreted in breast milk.

## 2025-03-11 ENCOUNTER — INPATIENT (INPATIENT)
Facility: HOSPITAL | Age: 65
LOS: 0 days | Discharge: HOME | DRG: 310 | End: 2025-03-12
Attending: STUDENT IN AN ORGANIZED HEALTH CARE EDUCATION/TRAINING PROGRAM | Admitting: STUDENT IN AN ORGANIZED HEALTH CARE EDUCATION/TRAINING PROGRAM
Payer: MEDICARE

## 2025-03-11 ENCOUNTER — TRANSCRIPTION ENCOUNTER (OUTPATIENT)
Age: 65
End: 2025-03-11

## 2025-03-11 VITALS
TEMPERATURE: 98 F | SYSTOLIC BLOOD PRESSURE: 143 MMHG | OXYGEN SATURATION: 100 % | RESPIRATION RATE: 16 BRPM | DIASTOLIC BLOOD PRESSURE: 66 MMHG | HEART RATE: 50 BPM

## 2025-03-11 DIAGNOSIS — I48.0 PAROXYSMAL ATRIAL FIBRILLATION: ICD-10-CM

## 2025-03-11 DIAGNOSIS — Z98.890 OTHER SPECIFIED POSTPROCEDURAL STATES: Chronic | ICD-10-CM

## 2025-03-11 LAB — ABO RH CONFIRMATION: SIGNIFICANT CHANGE UP

## 2025-03-11 PROCEDURE — 93656 COMPRE EP EVAL ABLTJ ATR FIB: CPT

## 2025-03-11 PROCEDURE — 93657 TX L/R ATRIAL FIB ADDL: CPT

## 2025-03-11 RX ORDER — ATORVASTATIN CALCIUM 80 MG/1
20 TABLET, FILM COATED ORAL AT BEDTIME
Refills: 0 | Status: DISCONTINUED | OUTPATIENT
Start: 2025-03-11 | End: 2025-03-12

## 2025-03-11 RX ORDER — METOPROLOL SUCCINATE 50 MG/1
1 TABLET, EXTENDED RELEASE ORAL
Refills: 0 | DISCHARGE

## 2025-03-11 RX ORDER — APIXABAN 2.5 MG/1
1 TABLET, FILM COATED ORAL
Refills: 0 | DISCHARGE

## 2025-03-11 RX ORDER — APIXABAN 2.5 MG/1
5 TABLET, FILM COATED ORAL EVERY 12 HOURS
Refills: 0 | Status: DISCONTINUED | OUTPATIENT
Start: 2025-03-11 | End: 2025-03-12

## 2025-03-11 RX ORDER — ACETAMINOPHEN 500 MG/5ML
650 LIQUID (ML) ORAL EVERY 6 HOURS
Refills: 0 | Status: DISCONTINUED | OUTPATIENT
Start: 2025-03-11 | End: 2025-03-12

## 2025-03-11 RX ORDER — ALPRAZOLAM 0.5 MG
0.25 TABLET, EXTENDED RELEASE 24 HR ORAL EVERY 6 HOURS
Refills: 0 | Status: DISCONTINUED | OUTPATIENT
Start: 2025-03-11 | End: 2025-03-12

## 2025-03-11 RX ORDER — METOPROLOL SUCCINATE 50 MG/1
25 TABLET, EXTENDED RELEASE ORAL DAILY
Refills: 0 | Status: DISCONTINUED | OUTPATIENT
Start: 2025-03-11 | End: 2025-03-12

## 2025-03-11 RX ORDER — OXYCODONE HYDROCHLORIDE AND ACETAMINOPHEN 10; 325 MG/1; MG/1
1 TABLET ORAL EVERY 6 HOURS
Refills: 0 | Status: DISCONTINUED | OUTPATIENT
Start: 2025-03-11 | End: 2025-03-12

## 2025-03-11 RX ADMIN — Medication 50 MILLILITER(S): at 22:10

## 2025-03-11 RX ADMIN — Medication 20 MILLIGRAM(S): at 11:32

## 2025-03-11 RX ADMIN — Medication 50 MILLILITER(S): at 11:32

## 2025-03-11 RX ADMIN — ATORVASTATIN CALCIUM 20 MILLIGRAM(S): 80 TABLET, FILM COATED ORAL at 22:10

## 2025-03-11 RX ADMIN — APIXABAN 5 MILLIGRAM(S): 2.5 TABLET, FILM COATED ORAL at 14:43

## 2025-03-11 NOTE — DISCHARGE NOTE PROVIDER - CARE PROVIDER_API CALL
Sal Moscoso  Cardiac Electrophysiology  [address] [place] [address]  Phone: [phone]  Fax: [phone]  Follow Up Time:

## 2025-03-11 NOTE — DISCHARGE NOTE PROVIDER - NSDCFUSCHEDAPPT_GEN_ALL_CORE_FT
[facility] Physician [facility]  ELECTROPH [address]  Scheduled Appointment: 03/17/2025    Ramo Mcrae  [facility]  CARDIOLOGY [address]  Scheduled Appointment: 03/20/2025

## 2025-03-11 NOTE — DISCHARGE NOTE PROVIDER - NSDCMRMEDTOKEN_GEN_ALL_CORE_FT
atorvastatin 20 mg oral tablet: 1 tab(s) orally once a day (at bedtime)  Eliquis 5 mg oral tablet: 1 tab(s) orally 2 times a day  famotidine 20 mg oral tablet: 1 tab(s) orally once a day  metoprolol succinate 25 mg oral tablet, extended release: 1 tab(s) orally once a day

## 2025-03-11 NOTE — DISCHARGE NOTE PROVIDER - NSDCFUADDINST_GEN_ALL_CORE_FT
Follow up with Dr. Moscoso in 3-4 weeks. Our office will contact you in 3-5 days to schedule this appointment. Please call [phone] with questions or concerns.

## 2025-03-11 NOTE — DISCHARGE NOTE PROVIDER - HOSPITAL COURSE
64 year old female with HTN, hyperlipidemia, CVA, PE, s/p MDT ILR 1/2024, and symptomatic paroxysmal atrial fibrillation CHADSVASc 5 (age, gender, CVA, HTN).  She presented electively and is now status post uncomplicated FARAPULSE pulsed field ablation of atrial fibrillation (PVI, PW). 64 year old female with HTN, hyperlipidemia, CVA, PE, s/p MDT ILR 1/2024, and symptomatic paroxysmal atrial fibrillation CHADSVASc 5 (age, gender, CVA, HTN).  She presented electively and is now status post uncomplicated FARAPULSE pulsed field ablation of atrial fibrillation (PVI, PW). The patient was observed overnight without event and was discharged home the following morning with a plan for outpatient follow up. 64 year old female with HTN, hyperlipidemia, CVA, PE, s/p MDT ILR 1/2024, and symptomatic paroxysmal atrial fibrillation CHADSVASc 5 (age, gender, CVA, HTN).  She presented electively and is now status post uncomplicated FARAPULSE pulsed field ablation of atrial fibrillation (PVI, PW). The patient was observed overnight without event and was discharged home the following morning with a plan for outpatient follow up.  The patient was observed overnight without event and was discharged home the following morning with a plan for outpatient follow up.

## 2025-03-12 ENCOUNTER — TRANSCRIPTION ENCOUNTER (OUTPATIENT)
Age: 65
End: 2025-03-12

## 2025-03-12 VITALS
OXYGEN SATURATION: 99 % | TEMPERATURE: 97 F | DIASTOLIC BLOOD PRESSURE: 66 MMHG | RESPIRATION RATE: 18 BRPM | SYSTOLIC BLOOD PRESSURE: 129 MMHG | HEART RATE: 55 BPM

## 2025-03-12 LAB
ANION GAP SERPL CALC-SCNC: 11 MMOL/L — SIGNIFICANT CHANGE UP (ref 5–17)
BUN SERPL-MCNC: 15.3 MG/DL — SIGNIFICANT CHANGE UP (ref 8–20)
CALCIUM SERPL-MCNC: 8.1 MG/DL — LOW (ref 8.4–10.5)
CHLORIDE SERPL-SCNC: 104 MMOL/L — SIGNIFICANT CHANGE UP (ref 96–108)
CO2 SERPL-SCNC: 24 MMOL/L — SIGNIFICANT CHANGE UP (ref 22–29)
CREAT SERPL-MCNC: 0.58 MG/DL — SIGNIFICANT CHANGE UP (ref 0.5–1.3)
EGFR: 100 ML/MIN/1.73M2 — SIGNIFICANT CHANGE UP
EGFR: 100 ML/MIN/1.73M2 — SIGNIFICANT CHANGE UP
GLUCOSE SERPL-MCNC: 112 MG/DL — HIGH (ref 70–99)
HCT VFR BLD CALC: 33.1 % — LOW (ref 34.5–45)
HGB BLD-MCNC: 11.2 G/DL — LOW (ref 11.5–15.5)
MAGNESIUM SERPL-MCNC: 1.9 MG/DL — SIGNIFICANT CHANGE UP (ref 1.6–2.6)
MCHC RBC-ENTMCNC: 30.5 PG — SIGNIFICANT CHANGE UP (ref 27–34)
MCHC RBC-ENTMCNC: 33.8 G/DL — SIGNIFICANT CHANGE UP (ref 32–36)
MCV RBC AUTO: 90.2 FL — SIGNIFICANT CHANGE UP (ref 80–100)
NRBC # BLD AUTO: 0 K/UL — SIGNIFICANT CHANGE UP (ref 0–0)
NRBC # FLD: 0 K/UL — SIGNIFICANT CHANGE UP (ref 0–0)
NRBC BLD AUTO-RTO: 0 /100 WBCS — SIGNIFICANT CHANGE UP (ref 0–0)
PLATELET # BLD AUTO: 144 K/UL — LOW (ref 150–400)
PMV BLD: 11.2 FL — SIGNIFICANT CHANGE UP (ref 7–13)
POTASSIUM SERPL-MCNC: 4.4 MMOL/L — SIGNIFICANT CHANGE UP (ref 3.5–5.3)
POTASSIUM SERPL-SCNC: 4.4 MMOL/L — SIGNIFICANT CHANGE UP (ref 3.5–5.3)
RBC # BLD: 3.67 M/UL — LOW (ref 3.8–5.2)
RBC # FLD: 14.5 % — SIGNIFICANT CHANGE UP (ref 10.3–14.5)
SODIUM SERPL-SCNC: 139 MMOL/L — SIGNIFICANT CHANGE UP (ref 135–145)
WBC # BLD: 11.48 K/UL — HIGH (ref 3.8–10.5)
WBC # FLD AUTO: 11.48 K/UL — HIGH (ref 3.8–10.5)

## 2025-03-12 PROCEDURE — 93657 TX L/R ATRIAL FIB ADDL: CPT

## 2025-03-12 PROCEDURE — C1759: CPT

## 2025-03-12 PROCEDURE — 93656 COMPRE EP EVAL ABLTJ ATR FIB: CPT

## 2025-03-12 PROCEDURE — C1894: CPT

## 2025-03-12 PROCEDURE — C1769: CPT

## 2025-03-12 PROCEDURE — 36415 COLL VENOUS BLD VENIPUNCTURE: CPT

## 2025-03-12 PROCEDURE — 93010 ELECTROCARDIOGRAM REPORT: CPT

## 2025-03-12 PROCEDURE — C1731: CPT

## 2025-03-12 PROCEDURE — C1893: CPT

## 2025-03-12 PROCEDURE — C1766: CPT

## 2025-03-12 PROCEDURE — C1733: CPT

## 2025-03-12 PROCEDURE — 93005 ELECTROCARDIOGRAM TRACING: CPT

## 2025-03-12 PROCEDURE — C9399: CPT

## 2025-03-12 PROCEDURE — 85027 COMPLETE CBC AUTOMATED: CPT

## 2025-03-12 PROCEDURE — 83735 ASSAY OF MAGNESIUM: CPT

## 2025-03-12 PROCEDURE — 80048 BASIC METABOLIC PNL TOTAL CA: CPT

## 2025-03-12 PROCEDURE — C1732: CPT

## 2025-03-12 RX ORDER — MAGNESIUM SULFATE 500 MG/ML
2 SYRINGE (ML) INJECTION ONCE
Refills: 0 | Status: COMPLETED | OUTPATIENT
Start: 2025-03-12 | End: 2025-03-12

## 2025-03-12 RX ADMIN — Medication 25 GRAM(S): at 08:14

## 2025-03-12 RX ADMIN — METOPROLOL SUCCINATE 25 MILLIGRAM(S): 50 TABLET, EXTENDED RELEASE ORAL at 06:12

## 2025-03-12 RX ADMIN — APIXABAN 5 MILLIGRAM(S): 2.5 TABLET, FILM COATED ORAL at 06:12

## 2025-03-12 NOTE — DISCHARGE NOTE NURSING/CASE MANAGEMENT/SOCIAL WORK - FINANCIAL ASSISTANCE
[facility] provides services at a reduced cost to those who are determined to be eligible through [facility]’s financial assistance program. Information regarding [facility]’s financial assistance program can be found by going to [email or web address] or by calling [phone].

## 2025-03-12 NOTE — PROGRESS NOTE ADULT - SUBJECTIVE AND OBJECTIVE BOX
64 year old female with HTN, hyperlipidemia, CVA, PE, s/p MDT ILR 1/2024, and symptomatic paroxysmal atrial fibrillation CHADSVASc 5 (age, gender, CVA, HTN).  She presents today for elective catheter ablation of atrial fibrillation.     PST and labs from 2/25/25 reviewed; denies interval change.   Confirms NPO > 8 hrs. Last dose Eliquis 3/10/25 PM.     - iv heplock  - confirmatory type and screen  - 2 units PRBC on hold  - consent destinee MEDINA   
Admission Criteria  Please admit the patient to the following service: CARDIOLOGY    Major Criteria:  - Continuous EKG monitoring is required for condition causing arrhythmia (hyperkalemia, etc)  - Significant volume load > 200 ml    Admit to: Cardiology/Telemetry     Patient is being admitted to the inpatient service due to high risk characteristics and need for further management/monitoring and is considered to be at a significantly increased risk of major adverse cardiac and vascular events if discharged.  
Pt seen and evaluated using language line solution  # [id number].  Pt doing well POD #1 s/p pulsed-field ablation of atrial fibrillation. Denies complaint.     EKG: SR 60bpm, MARÍA 136ms, QRSD 82  TELE: SR with occasional PVC    MEDICATIONS  (STANDING):  apixaban 5 milliGRAM(s) Oral every 12 hours  atorvastatin 20 milliGRAM(s) Oral at bedtime  famotidine    Tablet 20 milliGRAM(s) Oral daily  metoprolol succinate ER 25 milliGRAM(s) Oral daily    MEDICATIONS  (PRN):  acetaminophen     Tablet .. 650 milliGRAM(s) Oral every 6 hours PRN Mild Pain (1 - 3), Moderate Pain (4 - 6)  ALPRAZolam 0.25 milliGRAM(s) Oral every 6 hours PRN anxiety/insomnia  benzocaine/menthol Lozenge 1 Lozenge Oral every 2 hours PRN Sore Throat  oxycodone    5 mG/acetaminophen 325 mG 1 Tablet(s) Oral every 6 hours PRN Severe Pain (7 - 10)      Allergies  No Known Allergies          PAST MEDICAL & SURGICAL HISTORY:  Hypertension      Hypercholesterolemia      H/O breast biopsy          Vital Signs Last 24 Hrs  T(C): 36.3 (12 Mar 2025 08:26), Max: 36.7 (11 Mar 2025 17:21)  T(F): 97.4 (12 Mar 2025 08:26), Max: 98 (11 Mar 2025 17:21)  HR: 55 (12 Mar 2025 08:26) (55 - 69)  BP: 129/66 (12 Mar 2025 08:26) (112/65 - 148/70)  BP(mean): 81 (11 Mar 2025 22:08) (81 - 96)  RR: 18 (12 Mar 2025 08:26) (16 - 18)  SpO2: 99% (12 Mar 2025 08:26) (95% - 99%)    Parameters below as of 12 Mar 2025 08:26  Patient On (Oxygen Delivery Method): room air        Physical Exam:  Constitutional: NAD, AAOx3  Cardiovascular: +S1S2 RRR  Pulmonary: CTA b/l, unlabored  Abd: soft NTND   Groins: Groin sutures removed, Site C/D/I bilaterally; no bleeding, hematoma, edema  Extremities: no pedal edema,  Neuro: CN II-XII grossly intact, DO x4 with strength and sensation intact and equal bilaterally; no new abnormal neurological findings as compared to baseline    LABS:                        11.2   11.48 )-----------( 144      ( 12 Mar 2025 04:31 )             33.1     03-12    139  |  104  |  15.3  ----------------------------<  112[H]  4.4   |  24.0  |  0.58    Ca    8.1[L]      12 Mar 2025 04:31  Mg     1.9     03-12        Urinalysis Basic - ( 12 Mar 2025 04:31 )    Color: x / Appearance: x / SG: x / pH: x  Gluc: 112 mg/dL / Ketone: x  / Bili: x / Urobili: x   Blood: x / Protein: x / Nitrite: x   Leuk Esterase: x / RBC: x / WBC x   Sq Epi: x / Non Sq Epi: x / Bacteria: x        Assessment:   64 year old female with HTN, hyperlipidemia, CVA, PE, s/p MDT ILR 1/2024, and symptomatic paroxysmal atrial fibrillation CHADSVASc 5 (age, gender, CVA, HTN).  She presented electively and is now POD #1 status post uncomplicated FARAPULSE pulsed field ablation of atrial fibrillation (PVI, PW).    Plan:   Groin sutures removed.   C/w Eliquis 5mg Q 12 hrs.   DO NOT HOLD, INTERRUPT OR REVERSE ANTICOAGULATION WITHOUT EXPLICIT APPROVAL FROM EP SERVICE.   Continue other home medications  Pt instructed as to activity limitations - no lifting/pushing/pulling >10 lbs or strenuous exercise x 1 week.   Pt instructed as to access site care and f/up - written instructions included in d/c documents.  Outpt f/up in 2-4 weeks - office will contact pt to schedule.    
PROCEDURE(S): FARAPULSE Pulsed Field Ablation of Atrial Fibrillation    ELECTROPHYSIOLOGIST(S): Sal Moscoso MD         COMPLICATIONS:  none        DISPOSITION: observation      CONDITION: stable    Pt doing well s/p FARAPULSE pulsed field ablation of atrial fibrillation (PVI, PW) via b/l [facility] access. Denies complaint post procedure.     I/Os: net +    MEDICATIONS  (STANDING):  atorvastatin 20 milliGRAM(s) Oral at bedtime  famotidine    Tablet 20 milliGRAM(s) Oral daily  metoprolol succinate ER 25 milliGRAM(s) Oral daily  sodium chloride 0.9%. 1000 milliLiter(s) (50 mL/Hr) IV Continuous <Continuous>    MEDICATIONS  (PRN):  acetaminophen     Tablet .. 650 milliGRAM(s) Oral every 6 hours PRN Mild Pain (1 - 3), Moderate Pain (4 - 6)  ALPRAZolam 0.25 milliGRAM(s) Oral every 6 hours PRN anxiety/insomnia  benzocaine/menthol Lozenge 1 Lozenge Oral every 2 hours PRN Sore Throat  oxycodone    5 mG/acetaminophen 325 mG 1 Tablet(s) Oral every 6 hours PRN Severe Pain (7 - 10)    Allergies:  No Known Allergies    T(C): 36.6 (03-11-25 @ 07:18), Max: 36.6 (03-11-25 @ 07:18)  HR: 50 (03-11-25 @ 07:18) (50 - 50)  BP: 143/66 (03-11-25 @ 07:18) (143/66 - 143/66)  RR: 16 (03-11-25 @ 07:18) (16 - 16)  SpO2: 100% (03-11-25 @ 07:18) (100% - 100%)  Post-procedure VS: /57 HR 68 O2 sat 97% RR 22     Physical exam:   awake, alert, no obvious distress   Card: S1/S2, RRR, no m/g/r  Resp: lungs CTA b/l  Abd: S/NT/ND  Groins: hemostatic sutures in place; sites C/D/I; no bleeding, hematoma, erythema, exudate or edema  Ext: no edema; distal pulses intact  Skin: warm, dry, no rash     ECG: pending     Assessment:   64 year old female with HTN, hyperlipidemia, CVA, PE, s/p MDT ILR 1/2024, and symptomatic paroxysmal atrial fibrillation CHADSVASc 5 (age, gender, CVA, HTN).  She presented electively and is now status post uncomplicated FARAPULSE pulsed field ablation of atrial fibrillation (PVI, PW). Resting comfortably.     Plan:   Bedrest x 4 hours, then OOB with assistance and progress as tolerated.   Groin sutures to be removed by EP service in AM.   Pending groin status, resume Eliquis 5mg Q 12 hrs.   DO NOT HOLD, INTERRUPT OR REVERSE ANTICOAGULATION WITHOUT EXPLICIT APPROVAL FROM EP SERVICE.   Continue other home medications.   Gentle hydration overnight.   Please encourage incentive spirometry and ambulation once able.  Observation and monitoring on telemetry overnight with anticipated discharge in the AM and outpt follow up in 2-4 weeks.

## 2025-03-12 NOTE — DISCHARGE NOTE NURSING/CASE MANAGEMENT/SOCIAL WORK - NSDCVIVACCINE_GEN_ALL_CORE_FT
Tdap; 04-May-2016 14:07; Barbara Dasilva (RN); [employer]; [id number]; IntraMuscular; Deltoid Right.; 0.5 milliLiter(s); VIS (VIS Published: 09-May-2013, VIS Presented: 04-May-2016);

## 2025-03-12 NOTE — DISCHARGE NOTE NURSING/CASE MANAGEMENT/SOCIAL WORK - PATIENT PORTAL LINK FT
You can access the FollowMyHealth Patient Portal offered by [facility] by registering at the following website: [email or web address]. By joining [facility]’s FollowMyHealth portal, you will also be able to view your health information using other applications (apps) compatible with our system.

## 2025-03-17 ENCOUNTER — NON-APPOINTMENT (OUTPATIENT)
Age: 65
End: 2025-03-17

## 2025-03-17 ENCOUNTER — APPOINTMENT (OUTPATIENT)
Dept: ELECTROPHYSIOLOGY | Facility: CLINIC | Age: 65
End: 2025-03-17
Payer: MEDICARE

## 2025-03-17 PROCEDURE — 93298 REM INTERROG DEV EVAL SCRMS: CPT

## 2025-03-20 ENCOUNTER — APPOINTMENT (OUTPATIENT)
Dept: CARDIOLOGY | Facility: CLINIC | Age: 65
End: 2025-03-20
Payer: MEDICARE

## 2025-03-20 VITALS
HEIGHT: 60 IN | WEIGHT: 126 LBS | HEART RATE: 63 BPM | SYSTOLIC BLOOD PRESSURE: 142 MMHG | OXYGEN SATURATION: 99 % | BODY MASS INDEX: 24.74 KG/M2 | DIASTOLIC BLOOD PRESSURE: 80 MMHG

## 2025-03-20 DIAGNOSIS — I48.91 UNSPECIFIED ATRIAL FIBRILLATION: ICD-10-CM

## 2025-03-20 DIAGNOSIS — R00.2 PALPITATIONS: ICD-10-CM

## 2025-03-20 DIAGNOSIS — E78.5 HYPERLIPIDEMIA, UNSPECIFIED: ICD-10-CM

## 2025-03-20 DIAGNOSIS — I10 ESSENTIAL (PRIMARY) HYPERTENSION: ICD-10-CM

## 2025-03-20 DIAGNOSIS — I63.9 CEREBRAL INFARCTION, UNSPECIFIED: ICD-10-CM

## 2025-03-20 PROCEDURE — 99214 OFFICE O/P EST MOD 30 MIN: CPT

## 2025-03-20 PROCEDURE — G2211 COMPLEX E/M VISIT ADD ON: CPT

## 2025-03-20 PROCEDURE — 93000 ELECTROCARDIOGRAM COMPLETE: CPT

## 2025-04-07 ENCOUNTER — APPOINTMENT (OUTPATIENT)
Dept: ELECTROPHYSIOLOGY | Facility: CLINIC | Age: 65
End: 2025-04-07
Payer: MEDICARE

## 2025-04-07 VITALS
HEIGHT: 60 IN | OXYGEN SATURATION: 97 % | DIASTOLIC BLOOD PRESSURE: 86 MMHG | BODY MASS INDEX: 24.35 KG/M2 | WEIGHT: 124 LBS | HEART RATE: 64 BPM | SYSTOLIC BLOOD PRESSURE: 140 MMHG

## 2025-04-07 DIAGNOSIS — Z86.79 OTHER SPECIFIED POSTPROCEDURAL STATES: ICD-10-CM

## 2025-04-07 DIAGNOSIS — Z95.818 PRESENCE OF OTHER CARDIAC IMPLANTS AND GRAFTS: ICD-10-CM

## 2025-04-07 DIAGNOSIS — I48.91 UNSPECIFIED ATRIAL FIBRILLATION: ICD-10-CM

## 2025-04-07 DIAGNOSIS — Z98.890 OTHER SPECIFIED POSTPROCEDURAL STATES: ICD-10-CM

## 2025-04-07 PROCEDURE — 99214 OFFICE O/P EST MOD 30 MIN: CPT | Mod: 25

## 2025-04-07 PROCEDURE — 93000 ELECTROCARDIOGRAM COMPLETE: CPT

## 2025-04-10 ENCOUNTER — NON-APPOINTMENT (OUTPATIENT)
Age: 65
End: 2025-04-10

## 2025-04-21 ENCOUNTER — APPOINTMENT (OUTPATIENT)
Dept: ELECTROPHYSIOLOGY | Facility: CLINIC | Age: 65
End: 2025-04-21
Payer: MEDICARE

## 2025-04-21 ENCOUNTER — NON-APPOINTMENT (OUTPATIENT)
Age: 65
End: 2025-04-21

## 2025-04-21 PROCEDURE — 93298 REM INTERROG DEV EVAL SCRMS: CPT

## 2025-05-14 ENCOUNTER — OFFICE (OUTPATIENT)
Dept: URBAN - METROPOLITAN AREA CLINIC 94 | Facility: CLINIC | Age: 65
Setting detail: OPHTHALMOLOGY
End: 2025-05-14
Payer: MEDICARE

## 2025-05-14 DIAGNOSIS — H35.363: ICD-10-CM

## 2025-05-14 DIAGNOSIS — H25.13: ICD-10-CM

## 2025-05-14 DIAGNOSIS — H18.513: ICD-10-CM

## 2025-05-14 DIAGNOSIS — H11.151: ICD-10-CM

## 2025-05-14 PROCEDURE — 92004 COMPRE OPH EXAM NEW PT 1/>: CPT | Performed by: OPHTHALMOLOGY

## 2025-05-14 PROCEDURE — 92201 OPSCPY EXTND RTA DRAW UNI/BI: CPT | Performed by: OPHTHALMOLOGY

## 2025-05-14 PROCEDURE — 76514 ECHO EXAM OF EYE THICKNESS: CPT | Performed by: OPHTHALMOLOGY

## 2025-05-14 ASSESSMENT — REFRACTION_MANIFEST
OS_ADD: +2.50
OD_ADD: +2.75
OS_CYLINDER: -0.25
OD_CYLINDER: SPHERE
OD_VA1: 20/30
OD_ADD: +2.50
OS_VA1: 20/20
OD_SPHERE: +1.50
OS_VA2: 20/20
OD_CYLINDER: -0.75
OD_AXIS: 090
OD_VA2: 20/20
OS_VA2: 20/20(J1+)
OD_SPHERE: +1.00
OD_VA2: 20/20(J1+)
OD_VA1: 20/20
OU_VA: 20/25+1
OS_CYLINDER: SPHERE
OS_VA1: 20/25+1
OS_AXIS: 090
OS_SPHERE: +1.50
OU_VA: 20/20-
OS_SPHERE: +1.50
OS_ADD: +2.75

## 2025-05-14 ASSESSMENT — REFRACTION_CURRENTRX
OS_CYLINDER: -0.25
OS_SPHERE: +1.75
OD_VPRISM_DIRECTION: PROGS
OD_AXIS: 037
OS_ADD: +2.25
OS_AXIS: 163
OD_SPHERE: +1.25
OD_ADD: +2.25
OS_VPRISM_DIRECTION: PROGS
OD_OVR_VA: 20/
OS_OVR_VA: 20/
OD_CYLINDER: -0.75

## 2025-05-14 ASSESSMENT — REFRACTION_AUTOREFRACTION
OS_CYLINDER: -0.25
OD_CYLINDER: -0.75
OD_SPHERE: +1.75
OS_SPHERE: +1.75
OD_AXIS: 087
OS_AXIS: 089

## 2025-05-14 ASSESSMENT — PACHYMETRY
OD_CT_CORRECTION: -4
OS_CT_CORRECTION: -4
OS_CT_UM: 600
OD_CT_UM: 602

## 2025-05-14 ASSESSMENT — KERATOMETRY
OS_K1POWER_DIOPTERS: 43.00
OD_AXISANGLE_DEGREES: 090
OD_K2POWER_DIOPTERS: 43.50
OS_K2POWER_DIOPTERS: 43.25
OS_AXISANGLE_DEGREES: 035
OD_K1POWER_DIOPTERS: 43.50

## 2025-05-14 ASSESSMENT — CORNEAL DYSTROPHY - POSTERIOR
OS_POSTERIORDYSTROPHY: 2+ GUTTATA
OD_POSTERIORDYSTROPHY: 1+ GUTTATA

## 2025-05-14 ASSESSMENT — CONFRONTATIONAL VISUAL FIELD TEST (CVF)
OS_FINDINGS: FULL
OD_FINDINGS: FULL

## 2025-05-14 ASSESSMENT — TONOMETRY
OD_IOP_MMHG: 15
OS_IOP_MMHG: 17

## 2025-05-14 ASSESSMENT — VISUAL ACUITY
OS_BCVA: 20/50
OD_BCVA: 20/30

## 2025-05-22 ENCOUNTER — APPOINTMENT (OUTPATIENT)
Dept: ELECTROPHYSIOLOGY | Facility: CLINIC | Age: 65
End: 2025-05-22
Payer: MEDICARE

## 2025-05-22 ENCOUNTER — NON-APPOINTMENT (OUTPATIENT)
Age: 65
End: 2025-05-22

## 2025-05-22 PROCEDURE — 93298 REM INTERROG DEV EVAL SCRMS: CPT

## 2025-06-26 ENCOUNTER — NON-APPOINTMENT (OUTPATIENT)
Age: 65
End: 2025-06-26

## 2025-06-26 ENCOUNTER — APPOINTMENT (OUTPATIENT)
Dept: ELECTROPHYSIOLOGY | Facility: CLINIC | Age: 65
End: 2025-06-26
Payer: MEDICARE

## 2025-06-26 PROCEDURE — 93298 REM INTERROG DEV EVAL SCRMS: CPT

## 2025-07-30 ENCOUNTER — RX RENEWAL (OUTPATIENT)
Age: 65
End: 2025-07-30

## 2025-08-21 ENCOUNTER — NON-APPOINTMENT (OUTPATIENT)
Age: 65
End: 2025-08-21

## 2025-08-21 ENCOUNTER — APPOINTMENT (OUTPATIENT)
Dept: ELECTROPHYSIOLOGY | Facility: CLINIC | Age: 65
End: 2025-08-21

## 2025-08-21 PROCEDURE — 93298 REM INTERROG DEV EVAL SCRMS: CPT

## 2025-09-18 ENCOUNTER — APPOINTMENT (OUTPATIENT)
Dept: CARDIOLOGY | Facility: CLINIC | Age: 65
End: 2025-09-18
Payer: MEDICARE

## 2025-09-18 VITALS
OXYGEN SATURATION: 94 % | SYSTOLIC BLOOD PRESSURE: 140 MMHG | DIASTOLIC BLOOD PRESSURE: 80 MMHG | BODY MASS INDEX: 26.5 KG/M2 | WEIGHT: 135 LBS | HEART RATE: 75 BPM | HEIGHT: 60 IN

## 2025-09-18 DIAGNOSIS — I10 ESSENTIAL (PRIMARY) HYPERTENSION: ICD-10-CM

## 2025-09-18 DIAGNOSIS — R06.09 OTHER FORMS OF DYSPNEA: ICD-10-CM

## 2025-09-18 DIAGNOSIS — I48.91 UNSPECIFIED ATRIAL FIBRILLATION: ICD-10-CM

## 2025-09-18 DIAGNOSIS — R00.2 PALPITATIONS: ICD-10-CM

## 2025-09-18 DIAGNOSIS — E78.5 HYPERLIPIDEMIA, UNSPECIFIED: ICD-10-CM

## 2025-09-18 PROCEDURE — G2211 COMPLEX E/M VISIT ADD ON: CPT

## 2025-09-18 PROCEDURE — 93000 ELECTROCARDIOGRAM COMPLETE: CPT

## 2025-09-18 PROCEDURE — 99214 OFFICE O/P EST MOD 30 MIN: CPT
